# Patient Record
Sex: MALE | Employment: FULL TIME | URBAN - METROPOLITAN AREA
[De-identification: names, ages, dates, MRNs, and addresses within clinical notes are randomized per-mention and may not be internally consistent; named-entity substitution may affect disease eponyms.]

---

## 2018-06-13 ENCOUNTER — OFFICE VISIT (OUTPATIENT)
Dept: FAMILY MEDICINE | Facility: CLINIC | Age: 38
End: 2018-06-13
Payer: OTHER GOVERNMENT

## 2018-06-13 VITALS
HEIGHT: 68 IN | OXYGEN SATURATION: 99 % | HEART RATE: 72 BPM | TEMPERATURE: 97.9 F | SYSTOLIC BLOOD PRESSURE: 123 MMHG | DIASTOLIC BLOOD PRESSURE: 84 MMHG | BODY MASS INDEX: 26.07 KG/M2 | WEIGHT: 172 LBS | RESPIRATION RATE: 16 BRPM

## 2018-06-13 DIAGNOSIS — M79.671 PAIN IN BOTH FEET: Primary | ICD-10-CM

## 2018-06-13 DIAGNOSIS — M79.672 PAIN IN BOTH FEET: Primary | ICD-10-CM

## 2018-06-13 ASSESSMENT — ANXIETY QUESTIONNAIRES
6. BECOMING EASILY ANNOYED OR IRRITABLE: NOT AT ALL
7. FEELING AFRAID AS IF SOMETHING AWFUL MIGHT HAPPEN: NOT AT ALL
1. FEELING NERVOUS, ANXIOUS, OR ON EDGE: NOT AT ALL
GAD7 TOTAL SCORE: 0
5. BEING SO RESTLESS THAT IT IS HARD TO SIT STILL: NOT AT ALL
3. WORRYING TOO MUCH ABOUT DIFFERENT THINGS: NOT AT ALL
IF YOU CHECKED OFF ANY PROBLEMS ON THIS QUESTIONNAIRE, HOW DIFFICULT HAVE THESE PROBLEMS MADE IT FOR YOU TO DO YOUR WORK, TAKE CARE OF THINGS AT HOME, OR GET ALONG WITH OTHER PEOPLE: NOT DIFFICULT AT ALL
2. NOT BEING ABLE TO STOP OR CONTROL WORRYING: NOT AT ALL

## 2018-06-13 ASSESSMENT — ENCOUNTER SYMPTOMS
JOINT SWELLING: 0
MYALGIAS: 0
NUMBNESS: 0
WOUND: 0
FATIGUE: 0
ACTIVITY CHANGE: 0
TREMORS: 0
WEAKNESS: 0
FEVER: 0
BACK PAIN: 1

## 2018-06-13 ASSESSMENT — PATIENT HEALTH QUESTIONNAIRE - PHQ9: 5. POOR APPETITE OR OVEREATING: NOT AT ALL

## 2018-06-13 NOTE — PROGRESS NOTES
"      HPI:       Tristian Magaña is a 38 year old who presents for the following  Patient presents with:  Consult: Pt. presents to the clinic today with low back pain, radiating down Bilateral legs and feet X 6 months.    Has noted in the last 6 months has had pain anterior bilateral thighs and radiates down to his feet. Pain can \"move\" in different places in the front of his legs. Feet pain is the most significant. Has bought new shoes with gel inserts and has not improved pain. Has minimal pain in low back.     Pain in feet after work is rated 8/10. Will resolve with rest. Works on concrete daily. Is in the [employer] and has noted that he will have some foot pain and soreness while exercising, is not as severe as when he is at work. Denies any numbness. Has tingling in soles of feet intermittently. Pain is primarily in arch of feet bilaterally. Denies any history of foot problems. Does not go barefoot for any length of time, does not know if worsens without footwear on. Can have relieve with stretching feet, icing, and advil as needed (recently taking once daily).     Back discomfort rated 2/10, intermittent, typically occurs at the end of the work day. Denies any history of back issues. No pain in back at this time. No radiculopathy or paresthesias in bilateral legs. Denies any red flag symptoms.     Problem, Medication and Allergy Lists were   reviewed and are current.      No current outpatient prescriptions on file.         Allergies   Allergen Reactions     Penicillins      Patient is a new patient to this clinic and so  I reviewed/updated the Past Medical History, the Family History and the Social History.          Review of Systems:   Review of Systems   Constitutional: Negative for activity change, fatigue and fever.   Musculoskeletal: Positive for back pain. Negative for gait problem, joint swelling and myalgias.   Skin: Negative for pallor, rash and wound.   Neurological: Negative for tremors, weakness and " "numbness.             Physical Exam:   Patient Vitals for the past 24 hrs:   BP Temp Temp src Pulse Resp SpO2 Height Weight   06/13/18 1525 123/84 97.9  F (36.6  C) Oral 72 16 99 % 5' 8.4\" (173.7 cm) 172 lb (78 kg)     Body mass index is 25.85 kg/(m^2).  Vitals were reviewed and were normal     Physical Exam   Constitutional: He is oriented to person, place, and time. He appears well-developed and well-nourished.   HENT:   Head: Normocephalic and atraumatic.   Pulmonary/Chest: Effort normal.   Musculoskeletal: Normal range of motion. He exhibits no edema, tenderness or deformity.   Neurological: He is alert and oriented to person, place, and time. Coordination normal.   Skin: Skin is warm and dry. No rash noted. No erythema. No pallor.   Psychiatric: He has a normal mood and affect. His behavior is normal.   Vitals reviewed.     Left Ankle Exam   Swelling: None.    Range of Motion   Normal left ankle ROM  Dorsiflexion:     Normal  Plantar flexion: Normal  Inversion:         Normal  Eversion:         Normal    Muscle Strength   Normal left ankle strength    Comments:  Pain to arch of foot when palpated    Right Ankle Exam   Swelling: None    Range of Motion   Normal right ankle ROM  Dorsiflexion:     Normal  Plantar flexion: Normal  Inversion:         Normal  Eversion:         Normal    Muscle Strength   Normal right ankle strengthComments  Pain to arch when palpated      Back Exam   Sensation: Normal.  Gait: Normal.    Tenderness   None  SLR    Right: Negative  Left:    Negative    Tests   Toe Walk: Normal  Heel Walk: Normal    Reflexes   Patellar:  Normal  Achilles:  Normal    Comments:  Negative TURNER and FADIR bilaterally. No tenderness to spine, buttocks.             Results:      Results from the last 24 hoursNo results found for this or any previous visit (from the past 24 hour(s)).  Assessment and Plan     1. Pain in both feet  With recent pain in bilateral feet coinciding with new job where he is standing on " concrete all day podiatry referral indicated for custom orthotics to help support foot; if pain in legs, back continue once he is wearing specialty shoes consider physical therapy referral for stretching and strengthening. Patient to continue OTC analgesia with APAP and ibuprofen, icing foot, stretching his feet throughout his shift if possible.   - PODIATRY/FOOT & ANKLE SURGERY REFERRAL  There are no discontinued medications.  Options for treatment and follow-up care were reviewed with the patient. Tristian Magaña  engaged in the decision making process and verbalized understanding of the options discussed and agreed with the final plan.    TOM Schneider CNP

## 2018-06-13 NOTE — PATIENT INSTRUCTIONS
May take up to 800mg advil/ibuprofen with food three times daily for pain. Can take with 1000mg tylenol/acetaminophen (max 3 times/daily) as well. Can freeze water bottles, golf balls and roll your feet on them while sitting, try stretching out your feet while on breaks at work.     If your leg pain continues after orthotics are made follow up, will consider physical therapy referral.

## 2018-06-13 NOTE — MR AVS SNAPSHOT
After Visit Summary   6/13/2018    Tristian Magaña    MRN: [id number]           Patient Information     Date Of Birth          1980        Visit Information        Provider Department      6/13/2018 3:30 PM Malissa Alexander APRN CNP [facility]        Today's Diagnoses     Pain in both feet    -  1      Care Instructions    May take up to 800mg advil/ibuprofen with food three times daily for pain. Can take with 1000mg tylenol/acetaminophen (max 3 times/daily) as well. Can freeze water bottles, golf balls and roll your feet on them while sitting, try stretching out your feet while on breaks at work.     If your leg pain continues after orthotics are made follow up, will consider physical therapy referral.           Follow-ups after your visit        Additional Services     PODIATRY/FOOT & ANKLE SURGERY REFERRAL       Your provider has referred you to: [facility]: [facility]: [address] [place] [id number] Patient Scheduling Line: [phone] option 1 (scheduling and directions)    Please be aware that coverage of these services is subject to the terms and limitations of your health insurance plan.  Call member services at your health plan with any benefit or coverage questions.      Please bring the following to your appointment:  >>   Any x-rays, CTs or MRIs which have been performed.  Contact the facility where they were done to arrange for  prior to your scheduled appointment.    >>   List of current medications   >>   This referral request   >>   Any documents/labs given to you for this referral                  Follow-up notes from your care team     Return if symptoms worsen or fail to improve.      Who to contact     Please call your clinic at [phone] to:    Ask questions about your health    Make or cancel appointments    Discuss your medicines    Learn about your test results    Speak to your doctor            Additional Information About Your Visit       "  MyChart Information     Noveportert is an electronic gateway that provides easy, online access to your medical records. With icanbuy, you can request a clinic appointment, read your test results, renew a prescription or communicate with your care team.     To sign up for icanbuy visit the website at [email or web address]   You will be asked to enter the access code listed below, as well as some personal information. Please follow the directions to create your username and password.     Your access code is: [email or web address]  Expires: 2018  3:53 PM     Your access code will  in 90 days. If you need help or a new code, please contact your [facility] or call [phone] for assistance.        Care EveryWhere ID     This is your Care EveryWhere ID. This could be used by other organizations to access your [place] medical records  [id number]        Your Vitals Were     Pulse Temperature Respirations Height Pulse Oximetry BMI (Body Mass Index)    72 97.9  F (36.6  C) (Oral) 16 5' 8.4\" (173.7 cm) 99% 25.85 kg/m2       Blood Pressure from Last 3 Encounters:   18 123/84    Weight from Last 3 Encounters:   18 172 lb (78 kg)              We Performed the Following     PODIATRY/FOOT & ANKLE SURGERY REFERRAL        Primary Care Provider    None Specified       No primary provider on file.        Equal Access to Services     VANESSA RAMIREZ : Hadii aad ku hadasho Soomaali, waaxda luqadaha, qaybta kaalmada sweta, my morrow . So [facility] [phone].    ATENCIÓN: Si habla español, tiene a raines disposición servicios gratuitos de asistencia lingüística. Jennifer al [phone].    We comply with applicable federal civil rights laws and [place] laws. We do not discriminate on the basis of race, color, national origin, age, disability, sex, sexual orientation, or gender identity.            Thank you!     Thank you for choosing [facility]  for " your care. Our goal is always to provide you with excellent care. Hearing back from our patients is one way we can continue to improve our services. Please take a few minutes to complete the written survey that you may receive in the mail after your visit with us. Thank you!             Your Updated Medication List - Protect others around you: Learn how to safely use, store and throw away your medicines at www.disposemymeds.org.      Notice  As of 6/13/2018  3:53 PM    You have not been prescribed any medications.

## 2018-06-13 NOTE — NURSING NOTE
"38 year old  Chief Complaint   Patient presents with     Consult     Pt. presents to the clinic today with low back pain, radiating down Bilateral legs and feet X 6 months.       Blood pressure 123/84, pulse 72, temperature 97.9  F (36.6  C), temperature source Oral, resp. rate 16, height 5' 8.4\" (173.7 cm), weight 172 lb (78 kg), SpO2 99 %. Body mass index is 25.85 kg/(m^2).  BP completed using cuff size:    There is no problem list on file for this patient.      Wt Readings from Last 2 Encounters:   06/13/18 172 lb (78 kg)     BP Readings from Last 3 Encounters:   06/13/18 123/84       Allergies   Allergen Reactions     Penicillins        No current outpatient prescriptions on file.     No current facility-administered medications for this visit.        Social History   Substance Use Topics     Smoking status: Never Smoker     Smokeless tobacco: Never Used     Alcohol use 3.6 oz/week     6 Cans of beer per week         Honoring Choices - Health Care Directive Guide offered to patient at time of visit.    Health Maintenance Due   Topic Date Due     TETANUS IMMUNIZATION (SYSTEM ASSIGNED)  03/09/1998     HIV SCREEN (SYSTEM ASSIGNED)  03/09/1998     LIPID SCREEN Q5 YR MALE (SYSTEM ASSIGNED)  03/09/2015         There is no immunization history on file for this patient.    No results found for: PAP      No lab results found.    PHQ-2 ( 1999 Pfizer) 6/13/2018   Q1: Little interest or pleasure in doing things 0   Q2: Feeling down, depressed or hopeless 0   PHQ-2 Score 0       PHQ-9 SCORE 6/13/2018   Total Score 0       DOMENICO-7 SCORE 6/13/2018   Total Score 0       No flowsheet data found.    Rody Cho CMA  June 13, 2018 3:29 PM  "

## 2018-06-14 ASSESSMENT — PATIENT HEALTH QUESTIONNAIRE - PHQ9: SUM OF ALL RESPONSES TO PHQ QUESTIONS 1-9: 0

## 2018-06-14 ASSESSMENT — ANXIETY QUESTIONNAIRES: GAD7 TOTAL SCORE: 0

## 2018-07-12 NOTE — TELEPHONE ENCOUNTER
FUTURE VISIT INFORMATION      FUTURE VISIT INFORMATION:    Date: 7/27/18    Time: 1140    Location: ORTHO  REFERRAL INFORMATION:    Referring provider:  MADELINE KEY    Referring providers clinic:  [facility]     Reason for visit/diagnosis  FOOT PAIN      RECORDS STATUS    RECORDS IN CHART

## 2018-07-27 ENCOUNTER — PRE VISIT (OUTPATIENT)
Dept: ORTHOPEDICS | Facility: CLINIC | Age: 38
End: 2018-07-27

## 2019-01-17 ENCOUNTER — OFFICE VISIT (OUTPATIENT)
Dept: FAMILY MEDICINE | Facility: CLINIC | Age: 39
End: 2019-01-17
Payer: OTHER GOVERNMENT

## 2019-01-17 VITALS
DIASTOLIC BLOOD PRESSURE: 84 MMHG | TEMPERATURE: 98.2 F | BODY MASS INDEX: 27.28 KG/M2 | HEART RATE: 72 BPM | RESPIRATION RATE: 16 BRPM | OXYGEN SATURATION: 98 % | WEIGHT: 180 LBS | HEIGHT: 68 IN | SYSTOLIC BLOOD PRESSURE: 131 MMHG

## 2019-01-17 DIAGNOSIS — R20.2 PARESTHESIA OF LEFT ARM: Primary | ICD-10-CM

## 2019-01-17 RX ORDER — CHLORAL HYDRATE 500 MG
2 CAPSULE ORAL DAILY
COMMUNITY

## 2019-01-17 ASSESSMENT — MIFFLIN-ST. JEOR: SCORE: 1710.97

## 2019-01-17 NOTE — NURSING NOTE
"38 year old  Chief Complaint   Patient presents with     Consult     Pt. presents to the clinic today with feelings of \"pins and needles\" in his left hand, arm to the elbow. X 6 months off and on.        Blood pressure 131/84, pulse 72, temperature 98.2  F (36.8  C), temperature source Oral, resp. rate 16, height 1.727 m (5' 8\"), weight 81.6 kg (180 lb), SpO2 98 %. Body mass index is 27.37 kg/m .  BP completed using cuff size:    There is no problem list on file for this patient.      Wt Readings from Last 2 Encounters:   01/17/19 81.6 kg (180 lb)   06/13/18 78 kg (172 lb)     BP Readings from Last 3 Encounters:   01/17/19 131/84   06/13/18 123/84       Allergies   Allergen Reactions     Penicillins        Current Outpatient Medications   Medication     fish oil-omega-3 fatty acids 1000 MG capsule     Multiple Vitamins-Minerals (MULTIVITAMIN ADULT PO)     No current facility-administered medications for this visit.        Social History     Tobacco Use     Smoking status: Never Smoker     Smokeless tobacco: Never Used   Substance Use Topics     Alcohol use: Yes     Alcohol/week: 3.6 oz     Types: 6 Cans of beer per week     Drug use: No         Honoring Choices - Health Care Directive Guide offered to patient at time of visit.    Health Maintenance Due   Topic Date Due     HIV SCREEN (SYSTEM ASSIGNED)  03/09/1998     DTAP/TDAP/TD IMMUNIZATION (1 - Tdap) 03/09/2005     LIPID SCREEN Q5 YR MALE (SYSTEM ASSIGNED)  03/09/2015     INFLUENZA VACCINE (1) 09/01/2018         There is no immunization history on file for this patient.    No results found for: PAP      No lab results found.    PHQ-2 ( 1999 Pfizer) 6/13/2018   Q1: Little interest or pleasure in doing things 0   Q2: Feeling down, depressed or hopeless 0   PHQ-2 Score 0       PHQ-9 SCORE 6/13/2018   PHQ-9 Total Score 0       DOMENICO-7 SCORE 6/13/2018   Total Score 0       No flowsheet data found.    Rody Cho CMA  January 17, 2019 2:47 PM  "

## 2019-01-17 NOTE — PATIENT INSTRUCTIONS
1) Referral to sports medicine today. If you are not able to get in to be seen within 2 weeks, please let me know and I will call them and attempt to expedite this process.     2) The walk-in clinic is always available to you as well. Information below:  [facility]opedic [facility]  [facility]   [address]oor 4  [address] [place] [address]  See Directions and Parking for this building.  Hours  7 days a week, 7 a.m. to 7 p.m.    Phone Numbers  Information: [phone]

## 2019-01-17 NOTE — PROGRESS NOTES
"       HPI       Tristian Magaña is a 38 year old who presents for     Chief Complaint   Patient presents with     Consult     Pt. presents to the clinic today with feelings of \"pins and needles\" in his left hand, arm to the elbow. X 6 months off and on.      Tristian reports a 6+ month history of intermittent numbness and tingling to his left upper extremity. Specifically, tingling is noted from his elbow down through his fingers. Denies pain, popping or clicking in the left shoulder. 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     Patient reports his symptoms are more bothersome at night and have made it difficult for him to fall asleep and have awoken him overnight. Reports he is generally so tired from working all day that he is able to sleep through the discomfort.     Tristian suspects the tingling is triggered by a nerve in his left shoulder. Reports stiffness and pain in the left side of his neck and notes a localized area of discomfort to anterior aspect of left shoulder just below his clavicle. He finds his symptoms are worse if he is sitting at a computer for prolonged periods of time or if he is leaning over onto his elbows such as when he is sitting at a bar. When pressure is placed on his left shoulder he has noted this to trigger the tingling in his left forearm, hand, and fingers. Numbness and tingling generally resolves when he changes positions. He denies weakness in his upper extremities. He has no symptoms in his right arm. Tristian has not tried any home therapies or medications to manage his symptoms.     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 He is on his feet all day long. 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       History reviewed. No pertinent past medical history.     Past Surgical History:   Procedure Laterality Date     NO HISTORY OF SURGERY       Current Outpatient Medications   Medication     fish oil-omega-3 fatty acids 1000 MG capsule     Multiple Vitamins-Minerals (MULTIVITAMIN ADULT PO)     No current facility-administered medications for this visit.         Allergies   Allergen Reactions     Penicillins        Problem, Medication and Allergy Lists were reviewed and updated if needed..    Patient is an established patient of this clinic..         Review of Systems:   Review of Systems     ROS: 10 point ROS neg other than the symptoms noted above in the HPI.         Physical Exam:     Vitals:    01/17/19 1445   BP: 131/84   BP Location: Left arm   Patient Position: Chair   Cuff Size: Adult Regular   Pulse: 72   Resp: 16   Temp: 98.2  F (36.8  C)   TempSrc: Oral   SpO2: 98%   Weight: 81.6 kg (180 lb)   Height: 1.727 m (5' 8\")     Body mass index is 27.37 kg/m .  Vitals were reviewed and were normal except mildly elevated blood pressure.      Physical Exam   Constitutional: He is oriented to person, place, and time. He appears well-developed and well-nourished. No distress.   Cardiovascular: Normal rate.   Pulmonary/Chest: Effort normal. No respiratory distress.   Musculoskeletal:        Left shoulder: Normal. He exhibits normal range of motion, no tenderness, no bony tenderness, no swelling, no effusion, no crepitus, no deformity, no laceration, no pain, no spasm, normal pulse and normal strength.   Neurological: He is alert and oriented to person, place, and time.   Skin: Skin is warm and dry.   Psychiatric: He has a normal mood and affect. His behavior is normal.       Results:     No laboratory testing was completed at today's visit.    Assessment and Plan      1. Paresthesia of left arm  Comment: Pt with 6+ month hx of numbness and tingling in left " arm, worse over the past 2 weeks. Pt suspects this is related to a pinched nerve in his left shoulder, has left sided neck pain and left shoulder pain that seems to trigger the tingling, worse with specific positions. On exam, able to elicit tingling in left forearm and hand with palpation of anterior left shoulder. Physical exam is otherwise unremarkable. I suspect there is left shoulder nerve involvement as possible cause of left arm numbness and tingling. Carpal tunnel syndrome is also on the differential list. Patient is eager to find answers and is interested in sports medicine referral. He will entertain the idea of PT in the meantime.   Plan:   - SPORTS MEDICINE REFERRAL   - PHYSICAL THERAPY REFERRAL; Future   - May also try [facility]    Follow-up: Return to clinic if symptoms fail to improve, worsen, or new symptoms develop with the above treatment plan.        There are no discontinued medications.    Options for treatment and follow-up care were reviewed with the patient. Tristian Magaña  engaged in the decision making process and verbalized understanding of the options discussed and agreed with the final plan.    TOM Bruner CNP

## 2019-01-25 NOTE — TELEPHONE ENCOUNTER
RECORDS RECEIVED FROM: internal   DATE RECEIVED: 1/24/19   NOTES STATUS DETAILS   OFFICE NOTE from referring provider Internal    OFFICE NOTE from other specialist Internal    DISCHARGE SUMMARY from hospital N/A    DISCHARGE REPORT from the ER N/A    OPERATIVE REPORT N/A    MEDICATION LIST Internal    IMPLANT RECORD/STICKER N/A    LABS     CBC/DIFF N/A    CULTURES N/A    INJECTIONS DONE IN RADIOLOGY N/A    MRI N/A    CT SCAN N/A    XRAYS (IMAGES & REPORTS) N/A    TUMOR     PATHOLOGY  Slides & report N/A    Paresthesia of left arm, Referred by Jing Noel, no outside records,no imaging,no previous surgerys

## 2019-01-29 ENCOUNTER — ANCILLARY PROCEDURE (OUTPATIENT)
Dept: GENERAL RADIOLOGY | Facility: CLINIC | Age: 39
End: 2019-01-29
Payer: OTHER GOVERNMENT

## 2019-01-29 ENCOUNTER — PRE VISIT (OUTPATIENT)
Dept: ORTHOPEDICS | Facility: CLINIC | Age: 39
End: 2019-01-29

## 2019-01-29 ENCOUNTER — OFFICE VISIT (OUTPATIENT)
Dept: ORTHOPEDICS | Facility: CLINIC | Age: 39
End: 2019-01-29
Payer: OTHER GOVERNMENT

## 2019-01-29 VITALS
HEIGHT: 68 IN | SYSTOLIC BLOOD PRESSURE: 131 MMHG | DIASTOLIC BLOOD PRESSURE: 84 MMHG | BODY MASS INDEX: 27.28 KG/M2 | WEIGHT: 180 LBS

## 2019-01-29 DIAGNOSIS — R20.2 PARESTHESIA OF LEFT ARM: Primary | ICD-10-CM

## 2019-01-29 DIAGNOSIS — R20.2 PARESTHESIA OF LEFT ARM: ICD-10-CM

## 2019-01-29 ASSESSMENT — MIFFLIN-ST. JEOR: SCORE: 1710.97

## 2019-01-29 NOTE — LETTER
"  1/29/2019      RE: Tristian Magaña  [address] [facility] [address]        Subjective:   Tristian Magaña is a 38 year old male who complains of left arm pain. He states that he has been dealing with the pain in his arm for about 5 months. About 4 weeks ago, he exacerbated the pain while at drill.  He states that he has left upper extremity pain and paresthesias which seem to radiate from the proximal left upper extremity toward the elbow into the forearm and then into the dorsal aspect of fingers 2 through 4 as best he can recall.  This is intermittent.  He has not noticed any motor weakness with this.  He does not have myriam neck pain per se.    Background:   Date of injury: None   Duration of symptoms: 5 months  Mechanism of Injury: Insidious Onset; Unknown   Aggravating factors: Computer use, worse at night  Relieving Factors: Ibuprofen, correct posture  Prior Evaluation: Prior Physician Evalutation: Dr. Harrison    PAST MEDICAL, SOCIAL, SURGICAL AND FAMILY HISTORY: He  has no past medical history on file.  He  has a past surgical history that includes no history of surgery.  His family history includes Dementia in his father; Diabetes in his father; No Known Problems in his brother, mother, and sister; Parkinsonism in his father; Seizure Disorder in his father.  He reports that  has never smoked. he has never used smokeless tobacco. He reports that he drinks about 3.6 oz of alcohol per week. He reports that he does not use drugs.    ALLERGIES: He is allergic to penicillins.    CURRENT MEDICATIONS: He has a current medication list which includes the following prescription(s): fish oil-omega-3 fatty acids and multiple vitamins-minerals.     REVIEW OF SYSTEMS: 12 point review of systems is negative except as noted above.     Exam:   /84   Ht 1.727 m (5' 8\")   Wt 81.6 kg (180 lb)   BMI 27.37 kg/m         CONSTITUTIONAL: healthy, alert and no distress  HEAD: Normocephalic. No masses, lesions, " tenderness or abnormalities  SKIN: no suspicious lesions or rashes  GAIT: normal  NEUROLOGIC: Non-focal  PSYCHIATRIC: affect normal/bright and mentation appears normal.    MUSCULOSKELETAL:   Cervical spine: He has functional flexion and extension of the cervical spine.  Extension rotation to the right and left produce neck pain and he has a positive Spurling's maneuver to the left.  Motor examination of the bilateral upper extremities demonstrates 5 out of 5 strength bilaterally.  Sensation to light touch is intact bilaterally.  He has negative Mariah's bilaterally.  Deep tendon reflexes are trace and symmetric bilaterally.  He has no shoulder girdle tenderness and no trigger points.  Full range of motion in the left shoulder.  He has full range of motion in the left elbow with a negative Tinel's over the cubital tunnel.  He has full range of motion of the left wrist with no swelling and deformity and a negative Tinel's over the carpal tunnel and Guyon's canal.       Assessment/Plan:   (R20.2) Paresthesia of left arm  (primary encounter diagnosis)  Plan: X-ray Cervical Spine G/E 4 Views, MRI Cervical         spine w/o contrast        Tristian's symptoms as well as his physical exam suggest a left-sided neural foraminal encroachment or cervical disc.  I suspect this is going to be at C4-C5 for C5-C6 levels.  We will proceed with an MRI of the cervical spine.  He will return to follow-up with me pending the MRI for our review.      Fermin Hernandez MD

## 2019-01-30 ENCOUNTER — ANCILLARY PROCEDURE (OUTPATIENT)
Dept: MRI IMAGING | Facility: CLINIC | Age: 39
End: 2019-01-30
Payer: OTHER GOVERNMENT

## 2019-01-30 DIAGNOSIS — R20.2 PARESTHESIA OF LEFT ARM: ICD-10-CM

## 2019-02-05 ENCOUNTER — OFFICE VISIT (OUTPATIENT)
Dept: ORTHOPEDICS | Facility: CLINIC | Age: 39
End: 2019-02-05
Payer: OTHER GOVERNMENT

## 2019-02-05 VITALS — HEIGHT: 68 IN | BODY MASS INDEX: 27.28 KG/M2 | WEIGHT: 180 LBS | RESPIRATION RATE: 16 BRPM

## 2019-02-05 DIAGNOSIS — M54.12 CERVICAL RADICULOPATHY: Primary | ICD-10-CM

## 2019-02-05 DIAGNOSIS — M50.20 CERVICAL DISC HERNIATION: ICD-10-CM

## 2019-02-05 ASSESSMENT — MIFFLIN-ST. JEOR: SCORE: 1710.97

## 2019-02-05 NOTE — LETTER
2/5/2019      RE: Tristian Magaña  [address] [facility], [place], operated by [facility] [address]        Subjective:   Tristian Magaña is a 38 year old male who is here following up on left arm paresthesia. He is here to review his MRI results.     Date of injury: NA  Date last seen: 1/29/2019  Following Therapeutic Plan: Yes MRI 1/30/19  Pain: Minor improvement  Function: Unchanged    We have reviewed his MRI in detail.  He has a C5-C6 disc herniation with some cephalad migration.  This is causing compression of the nerve root.  We are going to proceed with a left C5-C6 transforaminal epidural steroid injection.  We will do that this week.  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  If he has excellent relief with his first injection he will cancel the second.  He will follow-up with me sometime after he returns from his pre-deployment training.    I spent 25 minutes in face to face time with the patient and greater than 20 minutes in counseling and coordination of care.    Fermin Hernandez MD

## 2019-02-05 NOTE — PROGRESS NOTES
Subjective:   Tristian Magaña is a 38 year old male who is here following up on left arm paresthesia. He is here to review his MRI results.     Date of injury: NA  Date last seen: 1/29/2019  Following Therapeutic Plan: Yes MRI 1/30/19  Pain: Minor improvement  Function: Unchanged    We have reviewed his MRI in detail.  He has a C5-C6 disc herniation with some cephalad migration.  This is causing compression of the nerve root.  We are going to proceed with a left C5-C6 transforaminal epidural steroid injection.  We will do that this week.  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  If he has excellent relief with his first injection he will cancel the second.  He will follow-up with me sometime after he returns from his pre-deployment training.    I spent 25 minutes in face to face time with the patient and greater than 20 minutes in counseling and coordination of care.

## 2019-02-07 ENCOUNTER — TELEPHONE (OUTPATIENT)
Dept: ORTHOPEDICS | Facility: CLINIC | Age: 39
End: 2019-02-07

## 2019-02-07 NOTE — TELEPHONE ENCOUNTER
"[facility] Call Center    Phone Message    May a detailed message be left on voicemail: no    Reason for Call: Other: Yamileth wanted to let Dr. Hernandez know that at the [facility], they only do nerve root block or direct epidural injections. Yamileth stated that on the order it noted a \"transforaminal epidural injection\" and they don't perform that at the facility there. Please call with any questions or modify the order to be faxed over. Pt has the procedure scheduled for tomorrow 2/8/2019.     Action Taken: Message routed to:  [facility] Sports Med  "

## 2019-02-07 NOTE — TELEPHONE ENCOUNTER
I poke with a radiologist over at [facility], and she states that everything has been figured out so nothing else is needed.

## 2019-02-08 ENCOUNTER — HOSPITAL ENCOUNTER (OUTPATIENT)
Dept: GENERAL RADIOLOGY | Facility: CLINIC | Age: 39
End: 2019-02-08
Attending: FAMILY MEDICINE
Payer: OTHER GOVERNMENT

## 2019-02-08 ENCOUNTER — HOSPITAL ENCOUNTER (OUTPATIENT)
Facility: CLINIC | Age: 39
Discharge: HOME | End: 2019-02-08
Admitting: FAMILY MEDICINE
Payer: OTHER GOVERNMENT

## 2019-02-08 VITALS
HEIGHT: 70 IN | SYSTOLIC BLOOD PRESSURE: 117 MMHG | BODY MASS INDEX: 25.62 KG/M2 | TEMPERATURE: 98.3 F | HEART RATE: 79 BPM | WEIGHT: 179 LBS | RESPIRATION RATE: 16 BRPM | OXYGEN SATURATION: 94 % | DIASTOLIC BLOOD PRESSURE: 77 MMHG

## 2019-02-08 DIAGNOSIS — M54.12 CERVICAL RADICULOPATHY: ICD-10-CM

## 2019-02-08 PROCEDURE — 25000128 H RX IP 250 OP 636: Performed by: FAMILY MEDICINE

## 2019-02-08 PROCEDURE — 40000863 ZZH STATISTIC RADIOLOGY XRAY, US, CT, MAR, NM

## 2019-02-08 PROCEDURE — 25000125 ZZHC RX 250: Performed by: FAMILY MEDICINE

## 2019-02-08 PROCEDURE — 27211111 XR NERVE BLOCK ROOT CERVICAL/THORACIC SINGLE LEVEL

## 2019-02-08 PROCEDURE — 25500064 ZZH RX 255 OP 636: Performed by: FAMILY MEDICINE

## 2019-02-08 RX ORDER — IBUPROFEN 600 MG/1
600 TABLET, FILM COATED ORAL EVERY 6 HOURS PRN
COMMUNITY
End: 2021-11-11

## 2019-02-08 RX ORDER — LIDOCAINE HYDROCHLORIDE 10 MG/ML
30 INJECTION, SOLUTION EPIDURAL; INFILTRATION; INTRACAUDAL; PERINEURAL ONCE
Status: COMPLETED | OUTPATIENT
Start: 2019-02-08 | End: 2019-02-08

## 2019-02-08 RX ORDER — DEXTROSE MONOHYDRATE 25 G/50ML
25-50 INJECTION, SOLUTION INTRAVENOUS
Status: CANCELLED | OUTPATIENT
Start: 2019-02-08

## 2019-02-08 RX ORDER — DEXTROSE MONOHYDRATE 25 G/50ML
25-50 INJECTION, SOLUTION INTRAVENOUS
Status: DISCONTINUED | OUTPATIENT
Start: 2019-02-08 | End: 2019-02-08 | Stop reason: HOSPADM

## 2019-02-08 RX ORDER — IOPAMIDOL 408 MG/ML
10 INJECTION, SOLUTION INTRATHECAL ONCE
Status: COMPLETED | OUTPATIENT
Start: 2019-02-08 | End: 2019-02-08

## 2019-02-08 RX ORDER — DEXAMETHASONE SODIUM PHOSPHATE 10 MG/ML
10 INJECTION, SOLUTION INTRAMUSCULAR; INTRAVENOUS ONCE
Status: COMPLETED | OUTPATIENT
Start: 2019-02-08 | End: 2019-02-08

## 2019-02-08 RX ORDER — NICOTINE POLACRILEX 4 MG
15-30 LOZENGE BUCCAL
Status: DISCONTINUED | OUTPATIENT
Start: 2019-02-08 | End: 2019-02-08 | Stop reason: HOSPADM

## 2019-02-08 RX ORDER — NICOTINE POLACRILEX 4 MG
15-30 LOZENGE BUCCAL
Status: CANCELLED | OUTPATIENT
Start: 2019-02-08

## 2019-02-08 RX ADMIN — LIDOCAINE HYDROCHLORIDE 3 ML: 10 INJECTION, SOLUTION EPIDURAL; INFILTRATION; INTRACAUDAL; PERINEURAL at 15:09

## 2019-02-08 RX ADMIN — IOPAMIDOL 1 ML: 408 INJECTION, SOLUTION INTRATHECAL at 15:07

## 2019-02-08 RX ADMIN — DEXAMETHASONE SODIUM PHOSPHATE 10 MG: 10 INJECTION, SOLUTION INTRAMUSCULAR; INTRAVENOUS at 15:09

## 2019-02-08 ASSESSMENT — MIFFLIN-ST. JEOR: SCORE: 1738.19

## 2019-02-08 NOTE — IP AVS SNAPSHOT
MRN:[id number]                      After Visit Summary   2/8/2019    Tristian Magaña    MRN: [id number]           Visit Information        Provider Department      2/8/2019  2:00 PM SH NEURO RAD; SHXR4 [facility] Radiology           Review of your medicines      Notice    This visit is during an admission. Changes to the med list made in this visit will be reflected in the After Visit Summary of the admission.           Protect others around you: Learn how to safely use, store and throw away your medicines at www.disposemymeds.org.       Follow-ups after your visit       Your next 10 appointments already scheduled    Mar 11, 2019  3:00 PM CDT  XR CERVICAL/THORACIC TRANSFORAMINAL INJ LEFT with SHXR4, SH MSK RAD  [facility] Radiology ([facility]) [facility] [address] [phone]   How do I prepare for my exam? (Food and drink instructions) Stop all food and drink (including water) 3 hours before your test or treatment.  How do I prepare for my exam? (Other instructions) Stop taking the following medicines (but talk to your doctor first): * If you take blood thinners, you may need to stop taking them a few days before treatment. Talk to your doctor before stopping these medicines. Stop taking Coumadin (warfarin) 3 days before treatment. Restart the day after treatment. * If you take Plavix, Ticlid, Pletal or Persantine, please ask your doctor if you should stop these medicines. You may need extra tests on the morning of your scan. * If you take Xarelto (Rivaroxaban), you may need to stop taking it 24 hours before treatment. Talk to your doctor before stopping this medicine. Restart the day after treatment.  You may take your other medicines as normal.  What should I wear: Wear comfortable clothes.  How long does the exam take: Injections take about 30 to 45 minutes. Most people spend up to 2 hours in the clinic or hospital.  What should I bring:  For nerve root injection, please send or bring copies of any MRIs or other scans you have had. Please bring a list of your current medicines to your exam. Include vitamins, minerals and over-the-counter medicines.  Do I need a :  Plan to have someone drive you home afterward.  What do I need to tell my doctor: Tell your doctor if: * You have ever had an allergic reaction to X-ray dye (contrast fluid). * If there s any chance you are pregnant.  What should I do after the exam: We may ask you to lie down for a short time before you go home. If you had a nerve shot and your arm or leg feels numb, you will stay for up to two hours until the numbness wears off. You may return to your normal activities the next day.  What is this test: A spinal shot is done in or near the spine. You may receive steroid medicine (to reduce swelling) or contrast fluid (dye that makes the area show up more clearly on X-rays).  Who should I call with questions: If you have any questions, please call the Imaging Department where you will have your exam. Directions, parking instructions, and other information is available on our website, [email or web address].      Care Instructions       Further instructions from your care team       Steroid Injection Discharge Instructions     After you go home:      You may resume your normal diet.    Care of Puncture Site:      If you have a bandaid on your puncture site, you may remove it the next morning    You may shower tomorrow    No bath tubs, whirlpools or swimming for at least 3 days     Activity:      You may go back to normal activity in 24 hours    You should let pain be your guide as to the extent of your activities    Maintain any activity limitations as ordered by your provider    Do NOT drive a vehicle until tomorrow    Medicines:      You may resume all medications    Resume your Warfarin/Coumadin at your regular dose today. Follow up with your provider to have your INR  rechecked    Resume your Platelet Inhibitors and Aspirin tomorrow at your regular dose    For minor pain, you may take Acetaminophen (Tylenol) or Ibuprofen (Advil)    Pain:       You may experience increased or different pain over the next 24-48 hours    For the next 48 hrs - you may use ice packs for discomfort     Call your primary care doctor if:      You have severe pain that does not improve with pain medication    You have chills or a fever greater than 101 F (38 C)    The site is red, swollen, hot or tender    New problems with your bowel or bladder    Any questions or concerns    Other Instructions:      New numbness down your leg post injection is temporary and may last for up to 6 hours. You may need assistance with activity until your leg has normal sensation.    If you are diabetic, monitor your blood sugar closely. Contact the provider who manages your diabetes to help you control your blood sugar if needed.    For Your Information:      A steroid was injected to help decrease swelling and may help to reduce pain. It may take up to 7-10 days to obtain full results.    Some patients will get lasting relief from a single injection. Others may require up to 3 injections to get results. If you have more than one steroid injection, they should be given 2 weeks apart.    Side effects of your steroid injection are mild and will go away in 2-3 days  - Insomnia  - Heartburn  - Flushed face  - Water retention  - Increased appetite  - Increased blood sugar      If you have questions call:        [facility] Radiology Dept @ [phone]      The provider who performed your procedure was _________________.        Additional Information About Your Visit       [employer] Information    [facility] gives you secure access to your electronic health record. If you see a primary care provider, you can also send messages to your care team and make appointments. If you have questions, please call your primary care clinic.   If you do not have a primary care provider, please call [phone] and they will assist you.       Care EveryWhere ID    This is your Care EveryWhere ID. This could be used by other organizations to access your [facility] medical records  [id number]        Primary Care Provider Fax #    Physician No Ref-Primary [phone]      Equal Access to Services    [facility] : Hadii aad ku hadasho Soomaali, waaxda luqadaha, qaybta kaalmada adepriscillayada, my neal haynanette dennisniloalee morrow . So [facility] [phone].    ATENCIÓN: Si habla español, tiene a raines disposición servicios gratuitos de asistencia lingüística. Llame al [phone].    We comply with applicable federal civil rights laws and [place] laws. We do not discriminate on the basis of race, color, national origin, age, disability, sex, sexual orientation, or gender identity.           Thank you!    Thank you for choosing [facility] for your care. Our goal is always to provide you with excellent care. Hearing back from our patients is one way we can continue to improve our services. Please take a few minutes to complete the written survey that you may receive in the mail after you visit with us. Thank you!         Medication List     Notice    This visit is during an admission. Changes to the med list made in this visit will be reflected in the After Visit Summary of the admission.

## 2019-02-08 NOTE — DISCHARGE INSTRUCTIONS
Steroid Injection Discharge Instructions     After you go home:      You may resume your normal diet.    Care of Puncture Site:      If you have a bandaid on your puncture site, you may remove it the next morning    You may shower tomorrow    No bath tubs, whirlpools or swimming for at least 3 days     Activity:      You may go back to normal activity in 24 hours    You should let pain be your guide as to the extent of your activities    Maintain any activity limitations as ordered by your provider    Do NOT drive a vehicle until tomorrow    Medicines:      You may resume all medications    Resume your Warfarin/Coumadin at your regular dose today. Follow up with your provider to have your INR rechecked    Resume your Platelet Inhibitors and Aspirin tomorrow at your regular dose    For minor pain, you may take Acetaminophen (Tylenol) or Ibuprofen (Advil)    Pain:       You may experience increased or different pain over the next 24-48 hours    For the next 48 hrs - you may use ice packs for discomfort     Call your primary care doctor if:      You have severe pain that does not improve with pain medication    You have chills or a fever greater than 101 F (38 C)    The site is red, swollen, hot or tender    New problems with your bowel or bladder    Any questions or concerns    Other Instructions:      New numbness down your leg post injection is temporary and may last for up to 6 hours. You may need assistance with activity until your leg has normal sensation.    If you are diabetic, monitor your blood sugar closely. Contact the provider who manages your diabetes to help you control your blood sugar if needed.    For Your Information:      A steroid was injected to help decrease swelling and may help to reduce pain. It may take up to 7-10 days to obtain full results.    Some patients will get lasting relief from a single injection. Others may require up to 3 injections to get results. If you have more than one  steroid injection, they should be given 2 weeks apart.    Side effects of your steroid injection are mild and will go away in 2-3 days  - Insomnia  - Heartburn  - Flushed face  - Water retention  - Increased appetite  - Increased blood sugar      If you have questions call:        [facility] Radiology Dept @ [phone]      The provider who performed your procedure was _________________.

## 2019-02-08 NOTE — PROGRESS NOTES
1520 Returned from x-ray post nerve root injection. VSS. Denies pain or numbness. Drinking water.  1545 OK to discharge when stable per Aris OLIVA  1600 Discharge to home ambulatory. Instructions were given prior to procedure by Neil SIMMS.

## 2019-02-08 NOTE — PROGRESS NOTES
RADIOLOGY PROCEDURE NOTE  Patient name: Tristian Magaña  MRN: [id number]  : 1980    Pre-procedure diagnosis: Cervicalgia  Post-procedure diagnosis: Same    Procedure Date/Time: 2019  3:12 PM  Procedure: C6 selective nerve root injection  Estimated blood loss: None  Specimen(s) collected with description: none  The patient tolerated the procedure well with no immediate complications.  Significant findings:none    See imaging dictation for procedural details.    Provider name: Aris Paula  Assistant(s):None

## 2019-03-11 ENCOUNTER — HOSPITAL ENCOUNTER (OUTPATIENT)
Facility: CLINIC | Age: 39
Discharge: HOME | End: 2019-03-11
Attending: RADIOLOGY | Admitting: RADIOLOGY
Payer: OTHER GOVERNMENT

## 2019-03-11 ENCOUNTER — HOSPITAL ENCOUNTER (OUTPATIENT)
Dept: GENERAL RADIOLOGY | Facility: CLINIC | Age: 39
End: 2019-03-11
Attending: FAMILY MEDICINE | Admitting: RADIOLOGY
Payer: OTHER GOVERNMENT

## 2019-03-11 VITALS
HEIGHT: 69 IN | HEART RATE: 71 BPM | RESPIRATION RATE: 16 BRPM | BODY MASS INDEX: 25.77 KG/M2 | SYSTOLIC BLOOD PRESSURE: 129 MMHG | TEMPERATURE: 98 F | DIASTOLIC BLOOD PRESSURE: 87 MMHG | OXYGEN SATURATION: 98 % | WEIGHT: 174 LBS

## 2019-03-11 DIAGNOSIS — M54.12 CERVICAL RADICULOPATHY: ICD-10-CM

## 2019-03-11 PROCEDURE — 40000863 ZZH STATISTIC RADIOLOGY XRAY, US, CT, MAR, NM

## 2019-03-11 PROCEDURE — 27211111 XR CERVICAL/THORACIC TRANSFORAMINAL INJ LEFT

## 2019-03-11 PROCEDURE — 25500064 ZZH RX 255 OP 636: Performed by: PHYSICIAN ASSISTANT

## 2019-03-11 PROCEDURE — 25000128 H RX IP 250 OP 636: Performed by: PHYSICIAN ASSISTANT

## 2019-03-11 PROCEDURE — 25000125 ZZHC RX 250: Performed by: PHYSICIAN ASSISTANT

## 2019-03-11 RX ORDER — DEXTROSE MONOHYDRATE 25 G/50ML
25-50 INJECTION, SOLUTION INTRAVENOUS
Status: CANCELLED | OUTPATIENT
Start: 2019-03-11

## 2019-03-11 RX ORDER — DEXTROSE MONOHYDRATE 25 G/50ML
25-50 INJECTION, SOLUTION INTRAVENOUS
Status: DISCONTINUED | OUTPATIENT
Start: 2019-03-11 | End: 2019-03-11 | Stop reason: HOSPADM

## 2019-03-11 RX ORDER — NICOTINE POLACRILEX 4 MG
15-30 LOZENGE BUCCAL
Status: CANCELLED | OUTPATIENT
Start: 2019-03-11

## 2019-03-11 RX ORDER — DEXAMETHASONE SODIUM PHOSPHATE 10 MG/ML
10 INJECTION, SOLUTION INTRAMUSCULAR; INTRAVENOUS ONCE
Status: COMPLETED | OUTPATIENT
Start: 2019-03-11 | End: 2019-03-11

## 2019-03-11 RX ORDER — NICOTINE POLACRILEX 4 MG
15-30 LOZENGE BUCCAL
Status: DISCONTINUED | OUTPATIENT
Start: 2019-03-11 | End: 2019-03-11 | Stop reason: HOSPADM

## 2019-03-11 RX ORDER — IOPAMIDOL 408 MG/ML
10 INJECTION, SOLUTION INTRATHECAL ONCE
Status: COMPLETED | OUTPATIENT
Start: 2019-03-11 | End: 2019-03-11

## 2019-03-11 RX ORDER — LIDOCAINE HYDROCHLORIDE 10 MG/ML
30 INJECTION, SOLUTION EPIDURAL; INFILTRATION; INTRACAUDAL; PERINEURAL ONCE
Status: COMPLETED | OUTPATIENT
Start: 2019-03-11 | End: 2019-03-11

## 2019-03-11 RX ADMIN — LIDOCAINE HYDROCHLORIDE 3 ML: 10 INJECTION, SOLUTION EPIDURAL; INFILTRATION; INTRACAUDAL; PERINEURAL at 16:11

## 2019-03-11 RX ADMIN — DEXAMETHASONE SODIUM PHOSPHATE 10 MG: 10 INJECTION, SOLUTION INTRAMUSCULAR; INTRAVENOUS at 16:14

## 2019-03-11 RX ADMIN — IOPAMIDOL 1 ML: 408 INJECTION, SOLUTION INTRATHECAL at 16:10

## 2019-03-11 RX ADMIN — DEXAMETHASONE SODIUM PHOSPHATE 10 MG: 10 INJECTION, SOLUTION INTRAMUSCULAR; INTRAVENOUS at 16:13

## 2019-03-11 ASSESSMENT — MIFFLIN-ST. JEOR: SCORE: 1694.64

## 2019-03-11 NOTE — IP AVS SNAPSHOT
[place] [address] [place] [address]  Phone:  [phone]                                    After Visit Summary   3/11/2019    Tristian Magaña    MRN: [id number]           After Visit Summary Signature Page    I have received my discharge instructions, and my questions have been answered. I have discussed any challenges I see with this plan with the nurse or doctor.    ..........................................................................................................................................  Patient/Patient Representative Signature      ..........................................................................................................................................  Patient Representative Print Name and Relationship to Patient    ..................................................               ................................................  Date                                   Time    ..........................................................................................................................................  Reviewed by Signature/Title    ...................................................              ..............................................  Date                                               Time          22EPIC Rev 08/18

## 2019-03-11 NOTE — IP AVS SNAPSHOT
MRN:[id number]                      After Visit Summary   3/11/2019    Tristian Magaña    MRN: [id number]           Visit Information        Department      3/11/2019  2:21 PM [facility]          Review of your medicines      UNREVIEWED medicines. Ask your doctor about these medicines       Dose / Directions   fish oil-omega-3 fatty acids 1000 MG capsule      Dose:  2 g  Take 2 g by mouth daily  Refills:  0     ibuprofen 600 MG tablet  Commonly known as:  ADVIL/MOTRIN      Dose:  600 mg  Take 600 mg by mouth every 6 hours as needed for moderate pain  Refills:  0     MULTIVITAMIN ADULT PO      Take by mouth daily  Refills:  0              Protect others around you: Learn how to safely use, store and throw away your medicines at www.disposemymeds.org.       Follow-ups after your visit       Your next 10 appointments already scheduled    Mar 11, 2019  3:00 PM CDT  (Arrive by 2:15 PM)  XR CERVICAL/THORACIC TRANSFORAMINAL INJ LEFT with SHXR4, SH MSK RAD  [facility] Radiology ([facility]) [address] [place] [id number] [phone]   How do I prepare for my exam? (Food and drink instructions) Stop all food and drink (including water) 3 hours before your test or treatment.  How do I prepare for my exam? (Other instructions) Stop taking the following medicines (but talk to your doctor first): * If you take blood thinners, you may need to stop taking them a few days before treatment. Talk to your doctor before stopping these medicines. Stop taking Coumadin (warfarin) 3 days before treatment. Restart the day after treatment. * If you take Plavix, Ticlid, Pletal or Persantine, please ask your doctor if you should stop these medicines. You may need extra tests on the morning of your scan. * If you take Xarelto (Rivaroxaban), you may need to stop taking it 24 hours before treatment. Talk to your doctor before stopping this medicine. Restart the day after  treatment.  You may take your other medicines as normal.  What should I wear: Wear comfortable clothes.  How long does the exam take: Injections take about 30 to 45 minutes. Most people spend up to 2 hours in the clinic or hospital.  What should I bring: For nerve root injection, please send or bring copies of any MRIs or other scans you have had. Please bring a list of your current medicines to your exam. Include vitamins, minerals and over-the-counter medicines.  Do I need a :  Plan to have someone drive you home afterward.  What do I need to tell my doctor: Tell your doctor if: * You have ever had an allergic reaction to X-ray dye (contrast fluid). * If there s any chance you are pregnant.  What should I do after the exam: We may ask you to lie down for a short time before you go home. If you had a nerve shot and your arm or leg feels numb, you will stay for up to two hours until the numbness wears off. You may return to your normal activities the next day.  What is this test: A spinal shot is done in or near the spine. You may receive steroid medicine (to reduce swelling) or contrast fluid (dye that makes the area show up more clearly on X-rays).  Who should I call with questions: If you have any questions, please call the Imaging Department where you will have your exam. Directions, parking instructions, and other information is available on our website, [email or web address].      Care Instructions       Further instructions from your care team       Steroid Injection Discharge Instructions     After you go home:      You may resume your normal diet.    Care of Puncture Site:      If you have a bandaid on your puncture site, you may remove it the next morning    You may shower tomorrow    No bath tubs, whirlpools or swimming for at least 3 days     Activity:      You may go back to normal activity in 24 hours    You should let pain be your guide as to the extent of your activities    Maintain any activity  limitations as ordered by your provider    Do NOT drive a vehicle until tomorrow    Medicines:      You may resume all medications    Resume your Warfarin/Coumadin at your regular dose today. Follow up with your provider to have your INR rechecked    Resume your Platelet Inhibitors and Aspirin tomorrow at your regular dose    For minor pain, you may take Acetaminophen (Tylenol) or Ibuprofen (Advil)    Pain:       You may experience increased or different pain over the next 24-48 hours    For the next 48 hrs - you may use ice packs for discomfort     Call your primary care doctor if:      You have severe pain that does not improve with pain medication    You have chills or a fever greater than 101 F (38 C)    The site is red, swollen, hot or tender    New problems with your bowel or bladder    Any questions or concerns    Other Instructions:      New numbness down your leg post injection is temporary and may last for up to 6 hours. You may need assistance with activity until your leg has normal sensation.    If you are diabetic, monitor your blood sugar closely. Contact the provider who manages your diabetes to help you control your blood sugar if needed.    For Your Information:      A steroid was injected to help decrease swelling and may help to reduce pain. It may take up to 7-10 days to obtain full results.    Some patients will get lasting relief from a single injection. Others may require up to 3 injections to get results. If you have more than one steroid injection, they should be given 2 weeks apart.    Side effects of your steroid injection are mild and will go away in 2-3 days  - Insomnia  - Heartburn  - Flushed face  - Water retention  - Increased appetite  - Increased blood sugar      If you have questions call:        [facility] Radiology Dept @ [phone]      The provider who performed your procedure was __Dr. Coppola_______________.        Additional Information About Your Visit        [facility] Information    [facility] gives you secure access to your electronic health record. If you see a primary care provider, you can also send messages to your care team and make appointments. If you have questions, please call your primary care clinic.  If you do not have a primary care provider, please call [phone] and they will assist you.       Care EveryWhere ID    This is your Care EveryWhere ID. This could be used by other organizations to access your [facility] medical records  [id number]        Primary Care Provider Fax #    Physician No Ref-Primary [phone]      Equal Access to Services    [facility]: Hadii gayle Lay, wachristin duong, qanohemy juarezaloseas sol, my morrow . So [facility] [phone].    ATENCIÓN: Si habla español, tiene a raines disposición servicios gratuitos de asistencia lingüística. Llame al [phone].    We comply with applicable federal civil rights laws and [place] laws. We do not discriminate on the basis of race, color, national origin, age, disability, sex, sexual orientation, or gender identity.           Thank you!    Thank you for choosing [facility] for your care. Our goal is always to provide you with excellent care. Hearing back from our patients is one way we can continue to improve our services. Please take a few minutes to complete the written survey that you may receive in the mail after you visit with us. Thank you!            Medication List      ASK your doctor about these medications          Morning Afternoon Evening Bedtime As Needed    fish oil-omega-3 fatty acids 1000 MG capsule  INSTRUCTIONS:  Take 2 g by mouth daily                     ibuprofen 600 MG tablet  Also known as:  ADVIL/MOTRIN  INSTRUCTIONS:  Take 600 mg by mouth every 6 hours as needed for moderate pain                     MULTIVITAMIN ADULT PO  INSTRUCTIONS:  Take by mouth daily

## 2019-03-11 NOTE — PROGRESS NOTES
RADIOLOGY PROCEDURE NOTE  Patient name: Tristian Magaña  MRN: [id number]  : 1980    Pre-procedure diagnosis: Left arm pain  Post-procedure diagnosis: Same    Procedure Date/Time: 2019  4:08 PM  Procedure: Left C5-C6 Cervical nerve root block  Estimated blood loss: None  Specimen(s) collected with description: none  The patient tolerated the procedure well with no immediate complications.  Significant findings:none    See imaging dictation for procedural details.    Provider name: Chava Cabezas  Assistant(s):None

## 2019-03-11 NOTE — DISCHARGE INSTRUCTIONS
Steroid Injection Discharge Instructions     After you go home:      You may resume your normal diet.    Care of Puncture Site:      If you have a bandaid on your puncture site, you may remove it the next morning    You may shower tomorrow    No bath tubs, whirlpools or swimming for at least 3 days     Activity:      You may go back to normal activity in 24 hours    You should let pain be your guide as to the extent of your activities    Maintain any activity limitations as ordered by your provider    Do NOT drive a vehicle until tomorrow    Medicines:      You may resume all medications    Resume your Warfarin/Coumadin at your regular dose today. Follow up with your provider to have your INR rechecked    Resume your Platelet Inhibitors and Aspirin tomorrow at your regular dose    For minor pain, you may take Acetaminophen (Tylenol) or Ibuprofen (Advil)    Pain:       You may experience increased or different pain over the next 24-48 hours    For the next 48 hrs - you may use ice packs for discomfort     Call your primary care doctor if:      You have severe pain that does not improve with pain medication    You have chills or a fever greater than 101 F (38 C)    The site is red, swollen, hot or tender    New problems with your bowel or bladder    Any questions or concerns    Other Instructions:      New numbness down your leg post injection is temporary and may last for up to 6 hours. You may need assistance with activity until your leg has normal sensation.    If you are diabetic, monitor your blood sugar closely. Contact the provider who manages your diabetes to help you control your blood sugar if needed.    For Your Information:      A steroid was injected to help decrease swelling and may help to reduce pain. It may take up to 7-10 days to obtain full results.    Some patients will get lasting relief from a single injection. Others may require up to 3 injections to get results. If you have more than one  steroid injection, they should be given 2 weeks apart.    Side effects of your steroid injection are mild and will go away in 2-3 days  - Insomnia  - Heartburn  - Flushed face  - Water retention  - Increased appetite  - Increased blood sugar      If you have questions call:        [facility] Radiology Dept @ [phone]      The provider who performed your procedure was __Dr. Coppola_______________.

## 2019-03-11 NOTE — PROGRESS NOTES
VS stable post procedure, denies any new discomfort.  Epidural site CDI.  Pt is taking po fluids well and ready to be discharged. Reinforce discharge instruction.

## 2019-03-11 NOTE — PROGRESS NOTES
1615 Returned from xray per cart post cervical nerve root injection. Denies complaints, offered po fluids and crackers. VSS.

## 2019-05-14 ENCOUNTER — OFFICE VISIT (OUTPATIENT)
Dept: ORTHOPEDICS | Facility: CLINIC | Age: 39
End: 2019-05-14
Payer: OTHER GOVERNMENT

## 2019-05-14 VITALS — BODY MASS INDEX: 26.36 KG/M2 | WEIGHT: 178 LBS | HEIGHT: 69 IN

## 2019-05-14 DIAGNOSIS — M54.12 CERVICAL RADICULOPATHY: Primary | ICD-10-CM

## 2019-05-14 ASSESSMENT — MIFFLIN-ST. JEOR: SCORE: 1712.78

## 2019-05-14 NOTE — LETTER
5/14/2019      RE: Tristian Magaña  [address] [facility] [address]        Subjective:   Tristian Magaña is a 39 year old male who follows up with neck and left arm pain. He states that he has had two cervical injections with radiology. He had no relief from the first injection on 2/8/19. He got about 2 weeks of relief from the second injection on 3/11/19.  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  He states it feels like a burning and tingling sensation.  Most of it is isolated in the region of the left shoulder now.  He does recount that he had excellent relief of his symptoms for 2 weeks following his second transforaminal epidural steroid injection.  He is having no upper extremity muscle weakness.  He notes that the paresthesias and dysesthesias are bothersome enough that he like to continue to address them and try and resolve them.  He is trying to avoid surgical intervention if possible.    Date of injury: NA  Date last seen: 2/7/2019  Following Therapeutic Plan: Yes   Pain: Worsening  Function: Worsening  Interval History:      PAST MEDICAL, SOCIAL, SURGICAL AND FAMILY HISTORY: He  has no past medical history on file.  He  has a past surgical history that includes no history of surgery.  His family history includes Dementia in his father; Diabetes in his father; No Known Problems in his brother, mother, and sister; Parkinsonism in his father; Seizure Disorder in his father.  He reports that he has never smoked. He has never used smokeless tobacco. He reports that he drinks about 3.6 oz of alcohol per week. He reports that he does not use drugs.    ALLERGIES: He is allergic to penicillins.    CURRENT MEDICATIONS: He has a current medication list which includes the following prescription(s): fish oil-omega-3 fatty acids, ibuprofen, and multiple vitamins-minerals.     REVIEW OF SYSTEMS: 10 point review of systems is negative except as noted  "above.     Exam:   Ht 1.753 m (5' 9\")   Wt 80.7 kg (178 lb)   BMI 26.29 kg/m         CONSTITUTIONAL: healthy, alert and no distress  HEAD: Normocephalic. No masses, lesions, tenderness or abnormalities  SKIN: no suspicious lesions or rashes  GAIT: normal  NEUROLOGIC: Non-focal  PSYCHIATRIC: affect normal/bright and mentation appears normal.    MUSCULOSKELETAL:   Cervical spine: He is nontender over the cervical spine itself.  Manual motor testing of the bilateral upper extremities demonstrates 5 out of 5 strength which is symmetric.  Sensation to light touch is intact over the bilateral upper extremities.       Assessment/Plan:   Tristian is a 39-year-old male with a C5-C6 disc with some encroachment into the left neural foramina.  He has some sequestration of the disc which suggests it is more likely to be surgical.  He had no relief with his first transforaminal epidural steroid injection but did get nice relief albeit short-lived with his second injection.  We are going to have him start physical therapy and will give a trial of cervical traction to see if this is beneficial for him.  After his second physical therapy appointment if he is not having significant benefit he will return for a left C5-C6 transforaminal epidural steroid injection but I would like that to be done at our facility.  I am also going to have him get scheduled to see Dr. Flaca Raymundo to discuss future surgical considerations.    Fermin Hernandez MD    "

## 2019-05-14 NOTE — PROGRESS NOTES
" Subjective:   Tristian Magaañ is a 39 year old male who follows up with neck and left arm pain. He states that he has had two cervical injections with radiology. He had no relief from the first injection on 2/8/19. He got about 2 weeks of relief from the second injection on 3/11/19.  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  He states it feels like a burning and tingling sensation.  Most of it is isolated in the region of the left shoulder now.  He does recount that he had excellent relief of his symptoms for 2 weeks following his second transforaminal epidural steroid injection.  He is having no upper extremity muscle weakness.  He notes that the paresthesias and dysesthesias are bothersome enough that he like to continue to address them and try and resolve them.  He is trying to avoid surgical intervention if possible.    Date of injury: NA  Date last seen: 2/7/2019  Following Therapeutic Plan: Yes   Pain: Worsening  Function: Worsening  Interval History:      PAST MEDICAL, SOCIAL, SURGICAL AND FAMILY HISTORY: He  has no past medical history on file.  He  has a past surgical history that includes no history of surgery.  His family history includes Dementia in his father; Diabetes in his father; No Known Problems in his brother, mother, and sister; Parkinsonism in his father; Seizure Disorder in his father.  He reports that he has never smoked. He has never used smokeless tobacco. He reports that he drinks about 3.6 oz of alcohol per week. He reports that he does not use drugs.    ALLERGIES: He is allergic to penicillins.    CURRENT MEDICATIONS: He has a current medication list which includes the following prescription(s): fish oil-omega-3 fatty acids, ibuprofen, and multiple vitamins-minerals.     REVIEW OF SYSTEMS: 10 point review of systems is negative except as noted above.     Exam:   Ht 1.753 m (5' 9\")   Wt 80.7 kg (178 lb)   BMI 26.29 kg/m      "   CONSTITUTIONAL: healthy, alert and no distress  HEAD: Normocephalic. No masses, lesions, tenderness or abnormalities  SKIN: no suspicious lesions or rashes  GAIT: normal  NEUROLOGIC: Non-focal  PSYCHIATRIC: affect normal/bright and mentation appears normal.    MUSCULOSKELETAL:   Cervical spine: He is nontender over the cervical spine itself.  Manual motor testing of the bilateral upper extremities demonstrates 5 out of 5 strength which is symmetric.  Sensation to light touch is intact over the bilateral upper extremities.       Assessment/Plan:   Tristian is a 39-year-old male with a C5-C6 disc with some encroachment into the left neural foramina.  He has some sequestration of the disc which suggests it is more likely to be surgical.  He had no relief with his first transforaminal epidural steroid injection but did get nice relief albeit short-lived with his second injection.  We are going to have him start physical therapy and will give a trial of cervical traction to see if this is beneficial for him.  After his second physical therapy appointment if he is not having significant benefit he will return for a left C5-C6 transforaminal epidural steroid injection but I would like that to be done at our facility.  I am also going to have him get scheduled to see Dr. Flaca Raymundo to discuss future surgical considerations.

## 2019-05-22 ENCOUNTER — THERAPY VISIT (OUTPATIENT)
Dept: PHYSICAL THERAPY | Facility: CLINIC | Age: 39
End: 2019-05-22
Payer: OTHER GOVERNMENT

## 2019-05-22 DIAGNOSIS — M54.12 CERVICAL RADICULOPATHY: ICD-10-CM

## 2019-05-22 PROCEDURE — 97140 MANUAL THERAPY 1/> REGIONS: CPT | Mod: GP | Performed by: PHYSICAL THERAPIST

## 2019-05-22 PROCEDURE — 97161 PT EVAL LOW COMPLEX 20 MIN: CPT | Mod: GP | Performed by: PHYSICAL THERAPIST

## 2019-05-22 NOTE — PROGRESS NOTES
[facility] Initial Evaluation  Subjective:  The history is provided by the patient. No  was used.   Tristian Magaña is a 39 year old male with a cervical spine (October 2018) condition.  Condition occurred with:  Insidious onset.  Condition occurred: for unknown reasons.  This is a chronic condition     Site of Pain: left neck   Radiates to:  None.  Pain is described as aching and is intermittent Pain Scale: 2-3/10   Associated symptoms:  Numbness and tingling ( left inside upper arm, all fingers left ). Worse during: none.  Symptoms are exacerbated by driving and rotating head (looking up ) Relieved by: neck neutral.  Since onset symptoms are unchanged.  Special tests:  MRI (Multilevel cervical spondylosis most prominent at C5-6 with moderate).  Previous treatment: none.  Improvement with previous treatment: na.  General health as reported by patient is good.                                              Objective:  System              Cervical/Thoracic Evaluation    AROM:  AROM Cervical:    Flexion:            WNL  Extension:       70% with left neck pain   Rotation:         Left: 80%      Right: 85% with left neck pain   Side Bend:      Left: 50%      Right:  50%      Headaches: none  Cervical Myotomes:    C1-2 (Neck Flex): Right: 5  C3 (neck side bend): Right: 5  C4 (shrug):  Left: 5    Right: 5  C5 (Deltoid):  Left: 5-    Right: 5  C6 (Biceps):  Left: 5-    Right: 5  C7 (Triceps):  Left: 5-    Right: 5  C8 (Thumb Ext): Left: 5    Right: 5  T1 (Intrinsics): Left: 5    Right: 5  DTR's:    C5 (Biceps):  Left:  2  Right:  2     C6 (Brachioradialis):  Left:  3  Right:  3     C7 (Triceps):  Left:  2  Right:  2              Cord Sign:  not assessed                                          Manual cervical traction decreases symptoms. Left greater than right upper trapezius and levator scapulae muscle tightness with palpation, moderate decrese in upper thoracic to mi thoracic mobility    General     ROS    Assessment/Plan:    Patient is a 39 year old male with cervical complaints.    Patient has the following significant findings with corresponding treatment plan.                Diagnosis 1:  Cervical radiculopathy   Pain -  hot/cold therapy, manual therapy, self management, education, directional preference exercise and home program  Decreased ROM/flexibility - manual therapy, therapeutic exercise, therapeutic activity and home program  Decreased strength - therapeutic exercise, therapeutic activities and home program  Impaired muscle performance - neuro re-education and home program  Decreased function - therapeutic activities and home program    Therapy Evaluation Codes:   1) Decision-Making    Low complexity using standardized patient assessment instrument and/or measureable assessment of functional outcome.  Cumulative Therapy Evaluation is: Low complexity.    Previous and current functional limitations:  (See Goal Flow Sheet for this information)    Short term and Long term goals: (See Goal Flow Sheet for this information)     Communication ability:  Patient appears to be able to clearly communicate and understand verbal and written communication and follow directions correctly.  Treatment Explanation - The following has been discussed with the patient:   RX ordered/plan of care  Anticipated outcomes  Possible risks and side effects  This patient would benefit from PT intervention to resume normal activities.   Rehab potential is good.    Frequency:  1 X week, once daily  Duration:  for 6 weeks  Discharge Plan:  Achieve all LTG.  Independent in home treatment program.  Reach maximal therapeutic benefit.    Please refer to the daily flowsheet for treatment today, total treatment time and time spent performing 1:1 timed codes.

## 2019-05-23 PROBLEM — M54.12 CERVICAL RADICULOPATHY: Status: ACTIVE | Noted: 2019-05-23

## 2019-05-28 NOTE — PROGRESS NOTES
[facility] Initial Evaluation  Subjective:                                         Medical allergies: Penicillin       Current occupation  .                                    Objective:  System    Physical Exam    General     ROS    Assessment/Plan:

## 2019-05-31 ENCOUNTER — THERAPY VISIT (OUTPATIENT)
Dept: PHYSICAL THERAPY | Facility: CLINIC | Age: 39
End: 2019-05-31
Payer: OTHER GOVERNMENT

## 2019-05-31 DIAGNOSIS — M54.12 CERVICAL RADICULOPATHY: ICD-10-CM

## 2019-05-31 PROCEDURE — 97140 MANUAL THERAPY 1/> REGIONS: CPT | Mod: GP | Performed by: PHYSICAL THERAPIST

## 2019-05-31 PROCEDURE — 97110 THERAPEUTIC EXERCISES: CPT | Mod: GP | Performed by: PHYSICAL THERAPIST

## 2019-05-31 PROCEDURE — 97112 NEUROMUSCULAR REEDUCATION: CPT | Mod: GP | Performed by: PHYSICAL THERAPIST

## 2019-06-12 ENCOUNTER — THERAPY VISIT (OUTPATIENT)
Dept: PHYSICAL THERAPY | Facility: CLINIC | Age: 39
End: 2019-06-12
Payer: OTHER GOVERNMENT

## 2019-06-12 DIAGNOSIS — M54.12 CERVICAL RADICULOPATHY: ICD-10-CM

## 2019-06-12 PROCEDURE — 97140 MANUAL THERAPY 1/> REGIONS: CPT | Mod: GP | Performed by: PHYSICAL THERAPIST

## 2019-06-12 PROCEDURE — 97110 THERAPEUTIC EXERCISES: CPT | Mod: GP | Performed by: PHYSICAL THERAPIST

## 2019-06-26 ENCOUNTER — THERAPY VISIT (OUTPATIENT)
Dept: PHYSICAL THERAPY | Facility: CLINIC | Age: 39
End: 2019-06-26
Payer: OTHER GOVERNMENT

## 2019-06-26 DIAGNOSIS — M54.12 CERVICAL RADICULOPATHY: ICD-10-CM

## 2019-06-26 PROCEDURE — 97112 NEUROMUSCULAR REEDUCATION: CPT | Mod: GP | Performed by: PHYSICAL THERAPIST

## 2019-06-26 PROCEDURE — 97530 THERAPEUTIC ACTIVITIES: CPT | Mod: GP | Performed by: PHYSICAL THERAPIST

## 2019-06-26 PROCEDURE — 97012 MECHANICAL TRACTION THERAPY: CPT | Mod: GP | Performed by: PHYSICAL THERAPIST

## 2019-07-16 ENCOUNTER — THERAPY VISIT (OUTPATIENT)
Dept: PHYSICAL THERAPY | Facility: CLINIC | Age: 39
End: 2019-07-16
Payer: OTHER GOVERNMENT

## 2019-07-16 DIAGNOSIS — M54.12 CERVICAL RADICULOPATHY: ICD-10-CM

## 2019-07-16 PROCEDURE — 97110 THERAPEUTIC EXERCISES: CPT | Mod: GP | Performed by: PHYSICAL THERAPIST

## 2019-07-16 PROCEDURE — 97140 MANUAL THERAPY 1/> REGIONS: CPT | Mod: GP | Performed by: PHYSICAL THERAPIST

## 2019-07-24 ENCOUNTER — THERAPY VISIT (OUTPATIENT)
Dept: PHYSICAL THERAPY | Facility: CLINIC | Age: 39
End: 2019-07-24
Payer: OTHER GOVERNMENT

## 2019-07-24 DIAGNOSIS — M54.12 CERVICAL RADICULOPATHY: ICD-10-CM

## 2019-07-24 PROCEDURE — 97012 MECHANICAL TRACTION THERAPY: CPT | Mod: GP | Performed by: PHYSICAL THERAPIST

## 2019-07-24 PROCEDURE — 97110 THERAPEUTIC EXERCISES: CPT | Mod: GP | Performed by: PHYSICAL THERAPIST

## 2019-07-24 NOTE — Clinical Note
Tristian Baig responded well to course of treatment and trial on home cervical unit. He does not feel like he needs unit at this time due to his improvement and will be on assignment in [employer] starting in September for 6 months. See discharge summary for details and told him to touch base with you if he needs referral for home cervical traction unit. Renee

## 2019-07-24 NOTE — PROGRESS NOTES
Subjective:  HPI                    Objective:  System    Physical Exam    General     [facility]    Assessment/Plan:    DISCHARGE REPORT    Progress reporting period is from 5- to 7-.       SUBJECTIVE  Subjective changes noted by patient:  Overall improvement has been significant and has no major complaints. He is sleeping thru night and driving pain free. Tristian feels comfortable continuing with independent home program to self manage symptoms.       Current pain level is 0/10.     Previous pain level was  2-3/10  Changes in function:  Yes (See Goal flowsheet attached for changes in current functional level)  Adverse reaction to treatment or activity: None    OBJECTIVE  Changes noted in objective findings:  Cervical/Thoracic Evaluation     AROM:  AROM Cervical:     Flexion:            WNL  Extension:       WNL   Rotation:         Left: 90%      Right: 90%     Side Bend:      Left: 50%      Right:  50%        Headaches: none  Cervical Myotomes:    C1-2 (Neck Flex): Right: 5  C3 (neck side bend): Right: 5  C4 (shrug):  Left: 5    Right: 5  C5 (Deltoid):  Left: 5    Right: 5  C6 (Biceps):  Left: 5    Right: 5  C7 (Triceps):  Left: 5    Right: 5  C8 (Thumb Ext): Left: 5    Right: 5  T1 (Intrinsics): Left: 5    Right: 5  DTR's:    C5 (Biceps):  Left:  2  Right:  2     C6 (Brachioradialis):  Left:  3  Right:  3     C7 (Triceps):  Left:  2  Right:  2                    Cord Sign:  not assessed                                             Manual cervical traction decreases symptoms. Left greater than right upper trapezius and levator scapulae muscle tightness with palpation, moderate decrese in upper thoracic to mi thoracic mobility   General      ROS     Assessment/Plan:    Patient is a 39 year old male with cervical complaints.    Patient has the following significant findings with corresponding treatment plan.                       ASSESSMENT/PLAN  Updated problem list and treatment plan: Cervical radiculopathy    Pain -  hot/cold therapy, manual therapy, self management, education, directional preference exercise and home program  Decreased ROM/flexibility - manual therapy, therapeutic exercise, therapeutic activity and home program  Decreased strength - therapeutic exercise, therapeutic activities and home program  Impaired muscle performance - neuro re-education and home program  Decreased function - therapeutic activities and home program    STG/LTGs have been met or progress has been made towards goals:  Yes (See Goal flow sheet completed today.)  Assessment of Progress: The patient has met all of their long term goals.  Self Management Plans:  Patient has been instructed in a home treatment program.  Patient is independent in a home treatment program.  Patient  has been instructed in self management of symptoms.  Patient is independent in self management of symptoms.  I have re-evaluated this patient and find that the nature, scope, duration and intensity of the therapy is appropriate for the medical condition of the patient.  Tristian continues to require the following intervention to meet STG and LTG's:  PT intervention is no longer required to meet STG/LTG.    Recommendations:  This patient is ready to be discharged from therapy and continue their home treatment program.He does not feel he needs home cervical traction unit at this time and will contact Dr. Hernandez in the future if he needs one.     Please refer to the daily flowsheet for treatment today, total treatment time and time spent performing 1:1 timed codes.

## 2019-07-25 PROBLEM — M54.12 CERVICAL RADICULOPATHY: Status: RESOLVED | Noted: 2019-05-23 | Resolved: 2019-07-25

## 2020-08-26 ENCOUNTER — OFFICE VISIT (OUTPATIENT)
Dept: FAMILY MEDICINE | Facility: CLINIC | Age: 40
End: 2020-08-26
Payer: OTHER GOVERNMENT

## 2020-08-26 VITALS
RESPIRATION RATE: 16 BRPM | BODY MASS INDEX: 28.73 KG/M2 | OXYGEN SATURATION: 97 % | HEART RATE: 80 BPM | HEIGHT: 69 IN | DIASTOLIC BLOOD PRESSURE: 82 MMHG | SYSTOLIC BLOOD PRESSURE: 122 MMHG | TEMPERATURE: 98.2 F | WEIGHT: 194 LBS

## 2020-08-26 DIAGNOSIS — H93.13 EAR RINGING SOUND, BILATERAL: Primary | ICD-10-CM

## 2020-08-26 DIAGNOSIS — H69.93 DYSFUNCTION OF BOTH EUSTACHIAN TUBES: ICD-10-CM

## 2020-08-26 DIAGNOSIS — H93.8X3 EAR PRESSURE, BILATERAL: ICD-10-CM

## 2020-08-26 ASSESSMENT — ENCOUNTER SYMPTOMS
MUSCLE CRAMPS: 0
DISTURBANCES IN COORDINATION: 0
SINUS CONGESTION: 0
MYALGIAS: 0
NECK PAIN: 0
DEPRESSION: 1
MUSCLE WEAKNESS: 0
JOINT SWELLING: 0
SINUS PAIN: 1
DIZZINESS: 0
FEVER: 0
BACK PAIN: 0
HEADACHES: 0
EYE IRRITATION: 1
TINGLING: 0
EXTREMITY NUMBNESS: 0
FATIGUE: 0
CHILLS: 0
COUGH: 1
STIFFNESS: 0
ARTHRALGIAS: 0

## 2020-08-26 ASSESSMENT — MIFFLIN-ST. JEOR: SCORE: 1780.36

## 2020-08-26 ASSESSMENT — ANXIETY QUESTIONNAIRES
3. WORRYING TOO MUCH ABOUT DIFFERENT THINGS: NOT AT ALL
6. BECOMING EASILY ANNOYED OR IRRITABLE: NOT AT ALL
GAD7 TOTAL SCORE: 0
2. NOT BEING ABLE TO STOP OR CONTROL WORRYING: NOT AT ALL
5. BEING SO RESTLESS THAT IT IS HARD TO SIT STILL: NOT AT ALL
7. FEELING AFRAID AS IF SOMETHING AWFUL MIGHT HAPPEN: NOT AT ALL
1. FEELING NERVOUS, ANXIOUS, OR ON EDGE: NOT AT ALL
IF YOU CHECKED OFF ANY PROBLEMS ON THIS QUESTIONNAIRE, HOW DIFFICULT HAVE THESE PROBLEMS MADE IT FOR YOU TO DO YOUR WORK, TAKE CARE OF THINGS AT HOME, OR GET ALONG WITH OTHER PEOPLE: NOT DIFFICULT AT ALL

## 2020-08-26 ASSESSMENT — PATIENT HEALTH QUESTIONNAIRE - PHQ9
5. POOR APPETITE OR OVEREATING: NOT AT ALL
SUM OF ALL RESPONSES TO PHQ QUESTIONS 1-9: 0

## 2020-08-26 NOTE — PATIENT INSTRUCTIONS
First, try an antihistamine such as Allegra, Zyrtec or Claritin daily. Next, use Debrox drops (2 drops in both ears monthly to prevent wax build up). Next, consider Flonase nasal spray to help open ear canals when they feel plugged. Next, drink 100 oz water daily. Next, consider taking 2,000 IUS Vit d daily. Next, follow up in the next month for a physical exam. Please come to Physical fasting which means nothing to eat for 8 hours, you may drink water. Thank you.   [facility] Medication Refill Request Information:  * Please contact your pharmacy regarding ANY request for medication refills.  ** [facility] Clinic Prescription Fax = [phone]  * Please allow 3 business days for routine medication refills.  * Please allow 5 business days for controlled substance medication refills.     [facility] Test Result notification information:  *You will be notified with in 7-10 days of your appointment day regarding the results of your test.  If you are on MyChart you will be notified as soon as the provider has reviewed the results and signed off on them.    [facility]: [phone]

## 2020-08-26 NOTE — NURSING NOTE
"40 year old  Chief Complaint   Patient presents with     Ear Problem     Pt. presents to the clinic today to establish care and for ringing in bilateral ears       Blood pressure 122/82, pulse 80, temperature 98.2  F (36.8  C), temperature source Oral, resp. rate 16, height 1.753 m (5' 9\"), weight 88 kg (194 lb), SpO2 97 %. Body mass index is 28.65 kg/m .  BP completed using cuff size:    Patient Active Problem List   Diagnosis   (none) - all problems resolved or deleted       Wt Readings from Last 2 Encounters:   08/26/20 88 kg (194 lb)   05/14/19 80.7 kg (178 lb)     BP Readings from Last 3 Encounters:   08/26/20 122/82   03/11/19 129/87   02/08/19 117/77       Allergies   Allergen Reactions     Penicillins        Current Outpatient Medications   Medication     fish oil-omega-3 fatty acids 1000 MG capsule     ibuprofen (ADVIL/MOTRIN) 600 MG tablet     Multiple Vitamins-Minerals (MULTIVITAMIN ADULT PO)     No current facility-administered medications for this visit.        Social History     Tobacco Use     Smoking status: Never Smoker     Smokeless tobacco: Never Used   Substance Use Topics     Alcohol use: Yes     Alcohol/week: 6.0 standard drinks     Types: 6 Cans of beer per week     Drug use: No       Honoring Choices - Health Care Directive Guide offered to patient at time of visit.    Health Maintenance Due   Topic Date Due     PREVENTIVE CARE VISIT  1980     HIV SCREENING  03/09/1995     DTAP/TDAP/TD IMMUNIZATION (1 - Tdap) 03/09/2005     LIPID  03/09/2015     PHQ-2  01/01/2020     INFLUENZA VACCINE (1) 09/01/2020         There is no immunization history on file for this patient.    No results found for: PAP      No lab results found.    PHQ-2 ( 1999 Pf[employer]) 8/26/2020 5/14/2019   Q1: Little interest or pleasure in doing things 0 0   Q2: Feeling down, depressed or hopeless 0 0   PHQ-2 Score 0 0       PHQ-9 SCORE 6/13/2018 8/26/2020   PHQ-9 Total Score 0 0       DOMENICO-7 SCORE 6/13/2018 8/26/2020   Total " Score 0 0       No flowsheet data found.    bilateral ear lavage done.  Small amount of cerumen irrigated using luke warm water and hydrogen peroxide without incident. Patient tolerated procedure well.       Rody Cho CMA  August 26, 2020 4:15 PM
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
## 2020-08-26 NOTE — PROGRESS NOTES
r side worse than left. Ringing in ears, no pain, slight pressure. A couple of weeks. No changes in hearing, no vision changes, no dizziness/falls.     Thinks it's dust related.   Wellbutrin: depression. Lowest dose daily. [facility] in [place]     1-2L h2o/day  6-8 hours sleep, feels rested.

## 2020-08-27 ASSESSMENT — ANXIETY QUESTIONNAIRES: GAD7 TOTAL SCORE: 0

## 2021-02-16 ENCOUNTER — OFFICE VISIT (OUTPATIENT)
Dept: FAMILY MEDICINE | Facility: CLINIC | Age: 41
End: 2021-02-16
Payer: OTHER GOVERNMENT

## 2021-02-16 VITALS
DIASTOLIC BLOOD PRESSURE: 70 MMHG | SYSTOLIC BLOOD PRESSURE: 120 MMHG | BODY MASS INDEX: 27.47 KG/M2 | OXYGEN SATURATION: 96 % | WEIGHT: 186 LBS | HEART RATE: 120 BPM | TEMPERATURE: 98.7 F

## 2021-02-16 DIAGNOSIS — R35.0 FREQUENT URINATION: Primary | ICD-10-CM

## 2021-02-16 DIAGNOSIS — F32.1 MODERATE MAJOR DEPRESSION (H): ICD-10-CM

## 2021-02-16 DIAGNOSIS — F41.1 GAD (GENERALIZED ANXIETY DISORDER): ICD-10-CM

## 2021-02-16 DIAGNOSIS — R35.0 FREQUENT URINATION: ICD-10-CM

## 2021-02-16 DIAGNOSIS — F43.10 PTSD (POST-TRAUMATIC STRESS DISORDER): ICD-10-CM

## 2021-02-16 LAB
ALBUMIN UR-MCNC: NEGATIVE MG/DL
APPEARANCE UR: CLEAR
BACTERIA #/AREA URNS HPF: ABNORMAL /HPF
BILIRUB UR QL STRIP: NEGATIVE
COLOR UR AUTO: YELLOW
GLUCOSE UR STRIP-MCNC: NEGATIVE MG/DL
HBA1C MFR BLD: 5.6 % (ref 0–5.6)
HGB UR QL STRIP: NEGATIVE
KETONES UR STRIP-MCNC: NEGATIVE MG/DL
LEUKOCYTE ESTERASE UR QL STRIP: NEGATIVE
MUCOUS THREADS #/AREA URNS LPF: PRESENT /LPF
NITRATE UR QL: NEGATIVE
PH UR STRIP: 6 PH (ref 5–7)
RBC #/AREA URNS AUTO: ABNORMAL /HPF
SOURCE: ABNORMAL
SP GR UR STRIP: 1.02 (ref 1–1.03)
UROBILINOGEN UR STRIP-ACNC: 0.2 EU/DL (ref 0.2–1)
WBC #/AREA URNS AUTO: ABNORMAL /HPF

## 2021-02-16 PROCEDURE — 84154 ASSAY OF PSA FREE: CPT | Mod: 90 | Performed by: PHYSICIAN ASSISTANT

## 2021-02-16 PROCEDURE — 81001 URINALYSIS AUTO W/SCOPE: CPT | Performed by: PHYSICIAN ASSISTANT

## 2021-02-16 PROCEDURE — 84153 ASSAY OF PSA TOTAL: CPT | Mod: 90 | Performed by: PHYSICIAN ASSISTANT

## 2021-02-16 PROCEDURE — 80053 COMPREHEN METABOLIC PANEL: CPT | Performed by: PHYSICIAN ASSISTANT

## 2021-02-16 PROCEDURE — 99214 OFFICE O/P EST MOD 30 MIN: CPT | Performed by: PHYSICIAN ASSISTANT

## 2021-02-16 PROCEDURE — 83036 HEMOGLOBIN GLYCOSYLATED A1C: CPT | Performed by: PHYSICIAN ASSISTANT

## 2021-02-16 PROCEDURE — 99000 SPECIMEN HANDLING OFFICE-LAB: CPT | Performed by: PHYSICIAN ASSISTANT

## 2021-02-16 PROCEDURE — 36415 COLL VENOUS BLD VENIPUNCTURE: CPT | Performed by: PHYSICIAN ASSISTANT

## 2021-02-16 NOTE — PATIENT INSTRUCTIONS
Labs today  If all normal, I'll refer to urology  Return to clinic for any new or worsening symptoms or go to ER Urgent care in off hours

## 2021-02-16 NOTE — PROGRESS NOTES
"    Assessment & Plan       ICD-10-CM    1. Frequent urination  R35.0 UA with Microscopic reflex to Culture     PSA total and free     Hemoglobin A1c     Comprehensive metabolic panel (BMP + Alb, Alk Phos, ALT, AST, Total. Bili, TP)   2. PTSD (post-traumatic stress disorder)  F43.10    3. DOMENICO (generalized anxiety disorder)  F41.1    4. Moderate major depression (H)  F32.1        Depression and anxiety managed by psychiatry.      BMI:   Estimated body mass index is 27.47 kg/m  as calculated from the following:    Height as of 8/26/20: 1.753 m (5' 9\").    Weight as of this encounter: 84.4 kg (186 lb).       Patient Instructions   Labs today  If all normal, I'll refer to urology  Return to clinic for any new or worsening symptoms or go to ER Urgent care in off hours        No follow-ups on file.    SUKUMAR [facility] SINCERE Lubin is a 40 year old who presents for the following health issues     HPI       Genitourinary - Male  Onset/Duration: 10 years - patient notices when he eats or drinks something that has a high amount of sugar is when he notices the frequency in urination. Patient also stated feel dehydrated.    Description:   Dysuria (painful urination): no}  Hematuria (blood in urine): no  Frequency: YES  Waking at night to urinate: no  Hesitancy (delay in urine): no  Retention (unable to empty): YES  Decrease in urinary flow: no  Incontinence: no  Progression of Symptoms:  worsening  Accompanying Signs & Symptoms:  Fever: no  Back/Flank pain: no  Urethral discharge: no  Testicle lumps/masses/pain: no  Nausea and/or vomiting: no  Abdominal pain: no  History:   History of frequent UTI s: no  History of kidney stones: no  History of hernias: no  Personal or Family history of Prostate problems: no  Sexually active: no  Precipitating or alleviating factors: None  Therapies tried and outcome: none    Urinates about 15 times a day  Feels really thirsty    Diet: lean " chicken, rice, broccoli, fast food 4-5 times per week, deli sandwich, cereal  Exercise: body resistance every other day    Review of Systems   CONSTITUTIONAL: NEGATIVE for fever, chills, change in weight  ENT/MOUTH: NEGATIVE for ear, mouth and throat problems  RESP: NEGATIVE for significant cough or SOB  CV: NEGATIVE for chest pain, palpitations or peripheral edema  GI: NEGATIVE for nausea, abdominal pain, heartburn, or change in bowel habits  : negative for dysuria, hematuria,, erectile dysfunction      Objective    /70   Pulse 120   Temp 98.7  F (37.1  C) (Temporal)   Wt 84.4 kg (186 lb)   SpO2 96%   BMI 27.47 kg/m    Body mass index is 27.47 kg/m .  Physical Exam   GENERAL: healthy, alert and no distress  EYES: Eyes grossly normal to inspection, PERRL and conjunctivae and sclerae normal  HENT: ear canals and TM's normal, nose and mouth without ulcers or lesions  NECK: no adenopathy, no asymmetry, masses, or scars and thyroid normal to palpation  RESP: lungs clear to auscultation - no rales, rhonchi or wheezes  CV: regular rate and rhythm, normal S1 S2, no S3 or S4, no murmur, click or rub, no peripheral edema and peripheral pulses strong  ABDOMEN: soft, nontender, no hepatosplenomegaly, no masses and bowel sounds normal  MS: no gross musculoskeletal defects noted, no edema  SKIN: no suspicious lesions or rashes  NEURO: Normal strength and tone, mentation intact and speech normal  PSYCH: mentation appears normal, affect flat                 No deformity or limitation of movement

## 2021-02-17 LAB
ALBUMIN SERPL-MCNC: 4 G/DL (ref 3.4–5)
ALP SERPL-CCNC: 112 U/L (ref 40–150)
ALT SERPL W P-5'-P-CCNC: 50 U/L (ref 0–70)
ANION GAP SERPL CALCULATED.3IONS-SCNC: 4 MMOL/L (ref 3–14)
AST SERPL W P-5'-P-CCNC: 19 U/L (ref 0–45)
BILIRUB SERPL-MCNC: 0.5 MG/DL (ref 0.2–1.3)
BUN SERPL-MCNC: 12 MG/DL (ref 7–30)
CALCIUM SERPL-MCNC: 9.4 MG/DL (ref 8.5–10.1)
CHLORIDE SERPL-SCNC: 106 MMOL/L (ref 94–109)
CO2 SERPL-SCNC: 30 MMOL/L (ref 20–32)
CREAT SERPL-MCNC: 1.2 MG/DL (ref 0.66–1.25)
GFR SERPL CREATININE-BSD FRML MDRD: 75 ML/MIN/{1.73_M2}
GLUCOSE SERPL-MCNC: 84 MG/DL (ref 70–99)
POTASSIUM SERPL-SCNC: 4.4 MMOL/L (ref 3.4–5.3)
PROT SERPL-MCNC: 7.8 G/DL (ref 6.8–8.8)
SODIUM SERPL-SCNC: 140 MMOL/L (ref 133–144)

## 2021-02-20 LAB
PSA FREE MFR SERPL: 10 %
PSA FREE SERPL-MCNC: 0.2 NG/ML
PSA SERPL-MCNC: 2 NG/ML (ref 0–4)

## 2021-02-26 NOTE — TELEPHONE ENCOUNTER
MEDICAL RECORDS REQUEST   [facility]  Urology Clinic  [address] [place] [address]  PHONE: [phone]  Fax: [phone]        FUTURE VISIT INFORMATION                                                   Tristian Magaña, : 1980 scheduled for future visit at [facility]    APPOINTMENT INFORMATION:    Date: 3/9/2021    Provider:  Anna Marie SIMMS    Reason for Visit/Diagnosis: Urinary retention     REFERRAL INFORMATION:    Referring provider:  N/A    Specialty: N/A    Referring providers clinic:      Clinic contact number:  N/A    RECORDS REQUESTED FOR VISIT                                                     NOTES  STATUS/DETAILS   OFFICE NOTE from referring provider  yes   OFFICE NOTE from other specialist  no   DISCHARGE SUMMARY from hospital  no   DISCHARGE REPORT from the ER  no   OPERATIVE REPORT  no   MEDICATION LIST  no   LABS     URINALYSIS (UA)  yes     PRE-VISIT CHECKLIST      Record collection complete Yes   Appointment appropriately scheduled           (right time/right provider) Yes   MyChart activation Yes   Questionnaire complete If no, please explain: In process      Completed by: Aracely Castro

## 2021-03-02 ENCOUNTER — PRE VISIT (OUTPATIENT)
Dept: UROLOGY | Facility: CLINIC | Age: 41
End: 2021-03-02

## 2021-03-02 NOTE — TELEPHONE ENCOUNTER
Reason for visit: Urinary urgency     Relevant information: referred by Kayla Hoffmann PA-C    Records/imaging/labs/orders: all records available, AUA assigned    Pt called: no need for a call

## 2021-03-07 ENCOUNTER — HEALTH MAINTENANCE LETTER (OUTPATIENT)
Age: 41
End: 2021-03-07

## 2021-03-09 ENCOUNTER — PRE VISIT (OUTPATIENT)
Dept: UROLOGY | Facility: CLINIC | Age: 41
End: 2021-03-09

## 2021-03-09 ENCOUNTER — VIRTUAL VISIT (OUTPATIENT)
Dept: UROLOGY | Facility: CLINIC | Age: 41
End: 2021-03-09
Attending: PHYSICIAN ASSISTANT
Payer: OTHER GOVERNMENT

## 2021-03-09 DIAGNOSIS — R35.0 URINARY FREQUENCY: Primary | ICD-10-CM

## 2021-03-09 PROCEDURE — 99203 OFFICE O/P NEW LOW 30 MIN: CPT | Mod: 95 | Performed by: NURSE PRACTITIONER

## 2021-03-09 ASSESSMENT — PAIN SCALES - GENERAL: PAINLEVEL: NO PAIN (0)

## 2021-03-09 NOTE — PROGRESS NOTES
Tristian is a 41 year old who is being evaluated via a billable video visit.      How would you like to obtain your AVS? MyChart  If the video visit is dropped, the invitation should be resent by: Text to cell phone: [phone]  Will anyone else be joining your video visit? No    Video Start Time: 2:45 PM        Video-Visit Details    Type of service:  Video Visit    Video End Time: 2:57 PM    Originating Location (pt. Location): Home    Distant Location (provider location):  [facility] [place]     Platform used for Video Visit: [employer]         Tristian Magaña complains of   Chief Complaint   Patient presents with     Consult     urinary urgency        Assessment/Plan:  41 year old male with morning urinary frequency for the past few years, which seems to correlate with caffeine intake. Also quite sedentary. In the absence of other signs or symptoms, and with negative testing thus far, I suspect his caffeine intake is the primary culprit for his symptoms, but there may also be a component of pelvic floor dysfunction present due to his high stress levels and sedentary lifestyle.   -Recommend working on reducing caffeine intake, and also increasing activity.  -Will monitor for now, but would be happy to see him as needed for an in-person visit if symptoms were to remain or worsen.     Julianna Thrasher, CNP  Department of Urology      Subjective:   Very pleasant 41 year old male with a history of anxiety, depression, and PTSD who presents for evaluation of urinary frequency. Was seen by FP a few weeks ago for this complaint, and UA, PSA, HgbA1c, and metabolic panel all largely unremarkable. This issue has been present for a few years. Frequency only an issue in the morning. No nocturia. Denies any associated symptoms, including dysuria, pyuria, hesitancy, intermittency, feelings of incomplete emptying, or any recent history of urinary tract infections or stones.    He used to drink alcohol  heavily, but is now sober. Acknowledges that he is addicted to caffeine and therefore consumes 3-4 cans of Coke per day. Urination seems more frequent in correlation with this.     Activity-wise, he is fairly sedentary. Is not working currently. Often sits on the couch for long hours. Feels he is under a constant amount of stress.       Objective:     Exam:   Patient is a 41 year old male   General Appearance: Well groomed, hygenic  Respiratory: No cough, no respiratory distress or labored breathing  Musculoskeletal: Grossly normal with no gross deficits  Skin: No discoloration or apparent rashes  Neurologic: No tremors  Psychiatric: Alert and oriented  Further examination is deferred due to the nature of our visit.

## 2021-03-09 NOTE — NURSING NOTE
Chief Complaint   Patient presents with     Consult     urinary urgency        There were no vitals taken for this visit. There is no height or weight on file to calculate BMI.    Patient Active Problem List   Diagnosis     PTSD (post-traumatic stress disorder)     DOMENICO (generalized anxiety disorder)     Moderate major depression (H)       Allergies   Allergen Reactions     Penicillins        Current Outpatient Medications   Medication Sig Dispense Refill     buPROPion HCl (WELLBUTRIN XL PO) 300 mg daily       fish oil-omega-3 fatty acids 1000 MG capsule Take 2 g by mouth daily       ibuprofen (ADVIL/MOTRIN) 600 MG tablet Take 600 mg by mouth every 6 hours as needed for moderate pain       Multiple Vitamins-Minerals (MULTIVITAMIN ADULT PO) Take by mouth daily       Vilazodone HCl (VIIBRYD PO) Daily         Social History     Tobacco Use     Smoking status: Never Smoker     Smokeless tobacco: Never Used   Substance Use Topics     Alcohol use: Yes     Alcohol/week: 6.0 standard drinks     Types: 6 Cans of beer per week     Comment: seldom     Drug use: No       Maureen Lopez CMA  3/9/2021  1:50 PM

## 2021-03-09 NOTE — PATIENT INSTRUCTIONS
UROLOGY CLINIC VISIT PATIENT INSTRUCTIONS    -Work on increasing activity at home and reducing caffeine intake, as tolerated.  -Follow up with me as needed moving forward for an in-person clinic visit if symptoms continue.     If you have any issues, questions or concerns in the meantime, do not hesitate to contact us at [phone] or via [employer].     It was a pleasure meeting with you today.  Thank you for allowing me and my team the privilege of caring for you today.  YOU are the reason we are here, and I truly hope we provided you with the excellent service you deserve.  Please let us know if there is anything else we can do for you so that we can be sure you are leaving completely satisfied with your care experience.    Julianna Thrasher, CNP

## 2021-03-09 NOTE — LETTER
3/9/2021       RE: Tristian Magaña  [address]  [place] [address]     Dear Colleague,    Thank you for referring your patient, Tristian Magaña, to the [facility] [place] at [facility]. Please see a copy of my visit note below.    Tristian is a 41 year old who is being evaluated via a billable video visit.      How would you like to obtain your AVS? MyChart  If the video visit is dropped, the invitation should be resent by: Text to cell phone: [phone]  Will anyone else be joining your video visit? No    Video Start Time: 2:45 PM        Video-Visit Details    Type of service:  Video Visit    Video End Time: 2:57 PM    Originating Location (pt. Location): Home    Distant Location (provider location):  [facility] [place]     Platform used for Video Visit: [employer]         Tristian Magaña complains of   Chief Complaint   Patient presents with     Consult     urinary urgency        Assessment/Plan:  41 year old male with morning urinary frequency for the past few years, which seems to correlate with caffeine intake. Also quite sedentary. In the absence of other signs or symptoms, and with negative testing thus far, I suspect his caffeine intake is the primary culprit for his symptoms, but there may also be a component of pelvic floor dysfunction present due to his high stress levels and sedentary lifestyle.   -Recommend working on reducing caffeine intake, and also increasing activity.  -Will monitor for now, but would be happy to see him as needed for an in-person visit if symptoms were to remain or worsen.     Julianna Thrasher, CNP  Department of Urology      Subjective:   Very pleasant 41 year old male with a history of anxiety, depression, and PTSD who presents for evaluation of urinary frequency. Was seen by FP a few weeks ago for this complaint, and UA, PSA, HgbA1c, and metabolic panel all largely unremarkable.  This issue has been present for a few years. Frequency only an issue in the morning. No nocturia. Denies any associated symptoms, including dysuria, pyuria, hesitancy, intermittency, feelings of incomplete emptying, or any recent history of urinary tract infections or stones.    He used to drink alcohol heavily, but is now sober. Acknowledges that he is addicted to caffeine and therefore consumes 3-4 cans of Coke per day. Urination seems more frequent in correlation with this.     Activity-wise, he is fairly sedentary. Is not working currently. Often sits on the couch for long hours. Feels he is under a constant amount of stress.       Objective:     Exam:   Patient is a 41 year old male   General Appearance: Well groomed, hygenic  Respiratory: No cough, no respiratory distress or labored breathing  Musculoskeletal: Grossly normal with no gross deficits  Skin: No discoloration or apparent rashes  Neurologic: No tremors  Psychiatric: Alert and oriented  Further examination is deferred due to the nature of our visit.

## 2021-10-10 ENCOUNTER — HEALTH MAINTENANCE LETTER (OUTPATIENT)
Age: 41
End: 2021-10-10

## 2021-10-19 PROBLEM — F32.9 MAJOR DEPRESSION: Status: ACTIVE | Noted: 2021-02-16

## 2021-11-11 ENCOUNTER — OFFICE VISIT (OUTPATIENT)
Dept: FAMILY MEDICINE | Facility: CLINIC | Age: 41
End: 2021-11-11
Payer: OTHER GOVERNMENT

## 2021-11-11 VITALS
OXYGEN SATURATION: 96 % | WEIGHT: 188.2 LBS | BODY MASS INDEX: 28.52 KG/M2 | SYSTOLIC BLOOD PRESSURE: 145 MMHG | DIASTOLIC BLOOD PRESSURE: 86 MMHG | HEIGHT: 68 IN | TEMPERATURE: 98.5 F | HEART RATE: 108 BPM

## 2021-11-11 DIAGNOSIS — H61.22 IMPACTED CERUMEN OF LEFT EAR: Primary | ICD-10-CM

## 2021-11-11 ASSESSMENT — MIFFLIN-ST. JEOR: SCORE: 1737.93

## 2021-11-11 NOTE — NURSING NOTE
"ROOM:1    Preferred Name: Tristian     41 year old  Chief Complaint   Patient presents with     Ear Problem     Left ear plugged       Blood pressure (!) 145/86, pulse 108, temperature 98.5  F (36.9  C), temperature source Oral, height 1.735 m (5' 8.3\"), weight 85.4 kg (188 lb 3.2 oz), SpO2 96 %. Body mass index is 28.36 kg/m .      Patient Active Problem List   Diagnosis     PTSD (post-traumatic stress disorder)     DOMENICO (generalized anxiety disorder)     Moderate major depression (H)       Wt Readings from Last 2 Encounters:   11/11/21 85.4 kg (188 lb 3.2 oz)   02/16/21 84.4 kg (186 lb)     BP Readings from Last 3 Encounters:   11/11/21 (!) 145/86   02/16/21 120/70   08/26/20 122/82       Allergies   Allergen Reactions     Penicillins        Current Outpatient Medications   Medication     buPROPion HCl (WELLBUTRIN XL PO)     fish oil-omega-3 fatty acids 1000 MG capsule     Multiple Vitamins-Minerals (MULTIVITAMIN ADULT PO)     Vilazodone HCl (VIIBRYD PO)     ibuprofen (ADVIL/MOTRIN) 600 MG tablet     No current facility-administered medications for this visit.       Social History     Tobacco Use     Smoking status: Never Smoker     Smokeless tobacco: Never Used   Substance Use Topics     Alcohol use: Yes     Alcohol/week: 6.0 standard drinks     Types: 6 Cans of beer per week     Comment: seldom     Drug use: No       Honoring Choices - Health Care Directive Guide offered to patient at time of visit.    Health Maintenance Due   Topic Date Due     PREVENTIVE CARE VISIT  Never done     ADVANCE CARE PLANNING  Never done     DEPRESSION ACTION PLAN  Never done     HIV SCREENING  Never done     HEPATITIS C SCREENING  Never done     LIPID  Never done     PHQ-9  02/26/2021     INFLUENZA VACCINE (1) 09/01/2021     COVID-19 Vaccine (3 - Booster for Moderna series) 10/18/2021         There is no immunization history on file for this patient.    No results found for: PAP      Recent Labs   Lab Test 02/16/21  1523   A1C 5.6   ALT " 50   CR 1.20   GFRESTIMATED 75   GFRESTBLACK 87   ALBUMIN 4.0   POTASSIUM 4.4       PHQ-2 ( 1999 Pfizer) 11/11/2021 2/16/2021   Q1: Little interest or pleasure in doing things 0 0   Q2: Feeling down, depressed or hopeless 1 3   PHQ-2 Score 1 3       PHQ-9 SCORE 6/13/2018 8/26/2020   PHQ-9 Total Score 0 0       DOMENICO-7 SCORE 6/13/2018 8/26/2020   Total Score 0 0       No flowsheet data found.      Jesenia Sotelo, [facility]  November 11, 2021 12:34 PM

## 2021-11-11 NOTE — PROGRESS NOTES
"Chief Complaint   Patient presents with     Ear Problem     Left ear plugged       HPI: Tristian is a 41 year old male here today with complaints of left ear being plugged. He also reports sore throat, cough, and some shortness of breath with exertion for the past 4-5 days. Denies fever or chest pain. 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 Vaccinated.   Tristian stated his left ear being plugged started approximately 1 week ago. It is intermittent, reporting it to be plugged approximately 60% of the time. When the ear becomes plugged he reports it last about a day or so and makes it more difficult to hear out of. Blowing his nose provides some relief at times. In 2020 he had ringing/pressure in his ears and he stated that an ear flushing helped.    Patient also noted his blood pressure being elevated today. Inquiring if could be associated with his medications, Wellbutrin and Vibryd.     ROS:Review of Systems - negative except for what's listed in the HPI    SH: The Patient's  reports that he has never smoked. He has never used smokeless tobacco. He reports current alcohol use of about 6.0 standard drinks of alcohol per week. He reports that he does not use drugs.      FH: The Patient's family history includes Dementia in his father; Diabetes in his father and maternal grandfather; Heart Disease in his maternal grandmother; No Known Problems in his brother, mother, and sister; Parkinsonism in his father; Seizure Disorder in his father.     Meds:    Current Outpatient Medications   Medication     buPROPion HCl (WELLBUTRIN XL PO)     fish oil-omega-3 fatty acids 1000 MG capsule     Multiple Vitamins-Minerals (MULTIVITAMIN ADULT PO)     Vilazodone HCl (VIIBRYD PO)     ibuprofen (ADVIL/MOTRIN) 600 MG tablet     No current facility-administered medications for this visit.     O:    BP (!) 145/86   Pulse 108   Temp 98.5  F (36.9  C) (Oral)   Ht 1.735 m (5' 8.3\")   Wt 85.4 kg " (188 lb 3.2 oz)   SpO2 96%   BMI 28.36 kg/m      Physical Examination:     General appearance - alert, well appearing, and in no distress    Mental status - alert, oriented to person, place, and time    Ears - external ears symmetrical. Inner left ear difficult to visualize, impacted with wax.     A/P:     (H61.22) Impacted cerumen of left ear  (primary encounter diagnosis)  Plan: Flushed out left ear today.     Recommended cerumenex or debrox weekly to avoid impacted cerumen.    Advised patient to get tested for COVID-19 with current symptoms.     Shakira Benjamin, [employer] student    I, Shana Monroy, DNP, APRN, FNP, ANP, reviewed and verified the nurse practitioner (NP)  student's documentation of the patient's history.  I performed the exam and medical decision making activities with the NP student, who was present for learning purposes.

## 2022-03-26 ENCOUNTER — HEALTH MAINTENANCE LETTER (OUTPATIENT)
Age: 42
End: 2022-03-26

## 2022-09-18 ENCOUNTER — HEALTH MAINTENANCE LETTER (OUTPATIENT)
Age: 42
End: 2022-09-18

## 2022-11-29 ENCOUNTER — OFFICE VISIT (OUTPATIENT)
Dept: FAMILY MEDICINE | Facility: CLINIC | Age: 42
End: 2022-11-29
Payer: OTHER GOVERNMENT

## 2022-11-29 VITALS
HEIGHT: 69 IN | BODY MASS INDEX: 27.98 KG/M2 | DIASTOLIC BLOOD PRESSURE: 83 MMHG | WEIGHT: 188.9 LBS | HEART RATE: 76 BPM | SYSTOLIC BLOOD PRESSURE: 158 MMHG | TEMPERATURE: 97.3 F

## 2022-11-29 DIAGNOSIS — I10 BENIGN ESSENTIAL HYPERTENSION: ICD-10-CM

## 2022-11-29 DIAGNOSIS — H61.23 BILATERAL IMPACTED CERUMEN: Primary | ICD-10-CM

## 2022-11-29 DIAGNOSIS — Z83.3 FAMILY HISTORY OF DIABETES MELLITUS: ICD-10-CM

## 2022-11-29 LAB
ALBUMIN SERPL BCG-MCNC: 4.7 G/DL (ref 3.5–5.2)
ALP SERPL-CCNC: 102 U/L (ref 40–129)
ALT SERPL W P-5'-P-CCNC: 36 U/L (ref 10–50)
ANION GAP SERPL CALCULATED.3IONS-SCNC: 14 MMOL/L (ref 7–15)
AST SERPL W P-5'-P-CCNC: 31 U/L (ref 10–50)
BILIRUB SERPL-MCNC: 0.4 MG/DL
BUN SERPL-MCNC: 13.7 MG/DL (ref 6–20)
CALCIUM SERPL-MCNC: 9.5 MG/DL (ref 8.6–10)
CHLORIDE SERPL-SCNC: 102 MMOL/L (ref 98–107)
CHOLEST SERPL-MCNC: 200 MG/DL
CREAT SERPL-MCNC: 1 MG/DL (ref 0.67–1.17)
DEPRECATED HCO3 PLAS-SCNC: 22 MMOL/L (ref 22–29)
ERYTHROCYTE [DISTWIDTH] IN BLOOD BY AUTOMATED COUNT: 13.3 % (ref 10–15)
GFR SERPL CREATININE-BSD FRML MDRD: >90 ML/MIN/1.73M2
GLUCOSE SERPL-MCNC: 88 MG/DL (ref 70–99)
HBA1C MFR BLD: 5.9 %
HCT VFR BLD AUTO: 48.2 % (ref 40–53)
HDLC SERPL-MCNC: 42 MG/DL
HGB BLD-MCNC: 15.6 G/DL (ref 13.3–17.7)
LDLC SERPL CALC-MCNC: 126 MG/DL
MCH RBC QN AUTO: 27 PG (ref 26.5–33)
MCHC RBC AUTO-ENTMCNC: 32.4 G/DL (ref 31.5–36.5)
MCV RBC AUTO: 83 FL (ref 78–100)
NONHDLC SERPL-MCNC: 158 MG/DL
PLATELET # BLD AUTO: 330 10E3/UL (ref 150–450)
POTASSIUM SERPL-SCNC: 4.4 MMOL/L (ref 3.4–5.3)
PROT SERPL-MCNC: 7.3 G/DL (ref 6.4–8.3)
RBC # BLD AUTO: 5.78 10E6/UL (ref 4.4–5.9)
SODIUM SERPL-SCNC: 138 MMOL/L (ref 136–145)
TRIGL SERPL-MCNC: 159 MG/DL
WBC # BLD AUTO: 9.1 10E3/UL (ref 4–11)

## 2022-11-29 PROCEDURE — 85027 COMPLETE CBC AUTOMATED: CPT | Performed by: NURSE PRACTITIONER

## 2022-11-29 PROCEDURE — 82043 UR ALBUMIN QUANTITATIVE: CPT | Performed by: NURSE PRACTITIONER

## 2022-11-29 PROCEDURE — 80061 LIPID PANEL: CPT | Performed by: NURSE PRACTITIONER

## 2022-11-29 PROCEDURE — 82040 ASSAY OF SERUM ALBUMIN: CPT | Performed by: NURSE PRACTITIONER

## 2022-11-29 PROCEDURE — 84443 ASSAY THYROID STIM HORMONE: CPT | Performed by: NURSE PRACTITIONER

## 2022-11-29 PROCEDURE — 83036 HEMOGLOBIN GLYCOSYLATED A1C: CPT | Performed by: NURSE PRACTITIONER

## 2022-11-29 PROCEDURE — 80053 COMPREHEN METABOLIC PANEL: CPT | Performed by: NURSE PRACTITIONER

## 2022-11-29 RX ORDER — DEXTROAMPHETAMINE SACCHARATE, AMPHETAMINE ASPARTATE MONOHYDRATE, DEXTROAMPHETAMINE SULFATE AND AMPHETAMINE SULFATE 2.5; 2.5; 2.5; 2.5 MG/1; MG/1; MG/1; MG/1
10 CAPSULE, EXTENDED RELEASE ORAL DAILY
COMMUNITY
Start: 2022-11-10

## 2022-11-29 RX ORDER — DEXTROAMPHETAMINE SACCHARATE, AMPHETAMINE ASPARTATE, DEXTROAMPHETAMINE SULFATE AND AMPHETAMINE SULFATE 1.25; 1.25; 1.25; 1.25 MG/1; MG/1; MG/1; MG/1
5 TABLET ORAL DAILY
COMMUNITY
Start: 2022-11-11

## 2022-11-29 ASSESSMENT — PATIENT HEALTH QUESTIONNAIRE - PHQ9: SUM OF ALL RESPONSES TO PHQ QUESTIONS 1-9: 7

## 2022-11-29 NOTE — PATIENT INSTRUCTIONS
Tinnitus and hearing loss   Both ears impacted: irrigate today with minium out   Use over the counter Carbamide (Debrox) 5 drops into ear daily for 3-4 days in the month to loosen wax. Avoid Qtips as this pushes wax into ears.   Follow up if ringing or hearing loss doesn't improve after irrigating and home treatment     High Blood Pressure (goal 120/80)  Lifestyle modifications; low sodium diet (DASH diet). Increase exercise 3-5 times per week.   Try to check blood pressure at home or at local pharmacy. Ordered BP monitor for home. Take at different times of day and record readings.   Signs/symptoms to monitor for include: headaches, blurry vision, swelling in legs   Schedule yearly eye exam     Labs today: CMP, CBC, lipids, A1C. Will follow up with results     Return to clinic in one month for physical with blood pressure recheck

## 2022-11-29 NOTE — NURSING NOTE
"ROOM:[address] [place], [place] [address]FIN[place]    Preferred Name: Tristian MCPHERSON AGREED:              [place] DECLINED:              42 year old  Chief Complaint   Patient presents with     Ear Problem     Right ear, hearing affected and high pitched noise worse when lying down, last couple weeks       Blood pressure (!) 152/103, pulse 102, temperature 97.3  F (36.3  C), temperature source Oral, height 1.758 m (5' 9.2\"), weight 85.7 kg (188 lb 14.4 oz). Body mass index is 27.73 kg/m .  BP completed using cuff size:        Patient Active Problem List   Diagnosis     PTSD (post-traumatic stress disorder)     DOMENICO (generalized anxiety disorder)     Moderate major depression (H)       Wt Readings from Last 2 Encounters:   11/29/22 85.7 kg (188 lb 14.4 oz)   11/11/21 85.4 kg (188 lb 3.2 oz)     BP Readings from Last 3 Encounters:   11/29/22 (!) 152/103   11/11/21 (!) 145/86   02/16/21 120/70       Allergies   Allergen Reactions     Penicillins        Current Outpatient Medications   Medication     amphetamine-dextroamphetamine (ADDERALL XR) 10 MG 24 hr capsule     amphetamine-dextroamphetamine (ADDERALL) 5 MG tablet     fish oil-omega-3 fatty acids 1000 MG capsule     Multiple Vitamins-Minerals (MULTIVITAMIN ADULT PO)     buPROPion HCl (WELLBUTRIN XL PO)     Vilazodone HCl (VIIBRYD PO)     No current facility-administered medications for this visit.       Social History     Tobacco Use     Smoking status: Never     Smokeless tobacco: Never   Substance Use Topics     Alcohol use: Yes     Alcohol/week: 6.0 standard drinks     Types: 6 Cans of beer per week     Comment: seldom     Drug use: No       Honoring Choices - Health Care Directive Guide offered to patient at time of visit.    Health Maintenance Due   Topic Date Due     YEARLY PREVENTIVE VISIT  Never done     ADVANCE CARE PLANNING  Never done     DEPRESSION ACTION PLAN  Never done     HIV SCREENING  Never done     HEPATITIS C SCREENING  Never done     LIPID  Never done     DTAP/TDAP/TD " IMMUNIZATION (2 - Td or Tdap) 01/13/2022     INFLUENZA VACCINE (1) 09/01/2022       Immunization History   Administered Date(s) Administered     COVID-19 Vaccine 18+ (Moderna) 03/21/2021, 04/18/2021, 01/13/2022     COVID-19 Vaccine Bivalent Booster 18+ (Moderna) 11/18/2022       No results found for: PAP    Recent Labs   Lab Test 02/16/21  1523   A1C 5.6   ALT 50   CR 1.20   GFRESTIMATED 75   GFRESTBLACK 87   ALBUMIN 4.0   POTASSIUM 4.4       PHQ-2 ( 1999 Pfizer) 11/29/2022 11/11/2021   Q1: Little interest or pleasure in doing things 0 0   Q2: Feeling down, depressed or hopeless 2 1   PHQ-2 Score 2 1   PHQ-2 Total Score (12-17 Years)- Positive if 3 or more points; Administer PHQ-A if positive - -       PHQ-9 SCORE 6/13/2018 8/26/2020 11/29/2022   PHQ-9 Total Score 0 0 7       DOMENICO-7 SCORE 6/13/2018 8/26/2020   Total Score 0 0       No flowsheet data found.    Sherry Angel    November 29, 2022 10:29 AM

## 2022-11-29 NOTE — PROGRESS NOTES
"ACUTE VISIT    Subjective  Tristian, 42 year old male presents today with a 2-3 week history of high pitched ringing in right ear. Ringing is constant but most noticeable at night. Not associated with pain, pounding, or throbbing. No associated symptoms of dizziness or headache. No known trauma to ear- does use Q-tips and history of impacted cerumen. No drainage noticed. Does feel there is a decrease in hearing in right ear. Denies fever, chills, malaise, nasal or sinus congestion. PMH + for PTSD, depression, anxiety. No longer on medication or seeing counseling but reports doing well. No surgical history. H significant for heart disease but unsure if HTN vs CAD.    Also presents with increased BP today, reports being told its been high in past. Never treated. Last CMP from 02/16/2021 shows creatinine 1.20.     ROS  Gen: denies fever, chills, malaise   HEENT: see HPI. Last eye exam 1 year ago. Denies vision changes. Negative sore throat.   Resp: denies shortness of breath   CV: denies chest pain, palpitations, irregular heart beat. Reports being told BP is high but thought last check was lower, uncertain of numbers. Wonders if high BP is from Adderall, takes 15mg once daily.   Neuro: denies numbness or tingling in extremities  Endo: negative for weight changes. +H DM (father)    Objective    Vitals   Initial BP: 152/103, pulse 102  BP (!) 158/83   Pulse 76   Temp 97.3  F (36.3  C) (Oral)   Ht 1.758 m (5' 9.2\")   Wt 85.7 kg (188 lb 14.4 oz)   BMI 27.73 kg/m         Physical Exam     Gen: alert, in no acute distress   HEENT: head normocephalic. Eyes symmetrical, EOM intact. Optic disks creamy, crisp. Bilateral ears impacted with cerumen. TM unable to visualized bilaterally. Irrigated by MA with minimal output. Follow up exam shows cerumen remains impacted. Utilized curettage bilaterally but unable to remove. TM remain unable to visualize post interventions. Nares patents, mucosa pink, intact, non edematous. OP " mucosa pink, intact. No tonsillar edema or patchy infiltrates.   Neck: thyroid displaced with swallow. Right side more prominent than left. No mass or nodules palpated. No lymphadenopathy.   Resp:unlabored respiratory effort. Lung sounds clear bilateral. No adventitious lung sounds.  CV: S1, S2. No S3, S4. No murmur, click, gallop. No peripheral edema.       Assessment/Plan    1. (H61.23) Bilateral impacted cerumen  (primary encounter diagnosis)  Comment: bilateral ears irrigated and utilized curettage bilaterally but unable to remove and TM remain unable to visualize post interventions. Home debrox ordered to soften. Can return for irrigation after Debrox if tinnitus persist. Avoid Q-tips.   Plan: REMOVE IMPACTED CERUMEN, carbamide peroxide         (DEBROX) 6.5 % otic solution        2. (I10) Benign essential hypertension  Comment: BP elevated today, slight improvement on recheck. Patient endorses dietary changes with increase in salt and decreased exercise. Last CMP from February 2021 shows high-normal creatinine (1.20). Provided lifestyle modifications and education; DASH diet, reduce caffeine. Will trial for 1 month and recheck BP. Home BP cuff ordered. Labs to assess renal function in setting of high-normal creatinine 1 year ago and to guide possible medication management. TSH in setting of prominent thyroid on right side. Consider ACE-I therapy, could consider SANDER. Goal /80  Plan: Comprehensive metabolic panel, CBC with         platelets, Lipid panel reflex to direct LDL         Fasting, Albumin Random Urine Quantitative with        Creat Ratio, Home Blood Pressure Monitor Order         for DME - ONLY FOR DME, TSH with free T4 reflex         3. (Z83.3) Family history of diabetes mellitus  Comment: in setting of high blood pressure, see problem 2 and positive family history, check A1c today.   Plan: Hemoglobin A1c      Follow up: please schedule a physical exam in 1 month; follow up with blood pressure at  that time     Clarita Lindsey), RN, BAN  Doctor of Nursing Practice Student  Class of 2024   [employer] [facility]  [employer]   I was present with the NPP student who participated in the service and in the documentation of the services provided. I have verified the history and personally performed the physical exam and medical decision making, as documented by the student and edited by me    TOM Hackett CNP  11/29/22  12:20 PM

## 2022-11-29 NOTE — NURSING NOTE
bilateral ear lavage done.  Small amount of cerumen irrigated using luke warm water and hydrogen peroxide without incident. Patient tolerated procedure well.     Sherry RANGEL CMA 11:35 AM 11/29/2022

## 2022-11-30 LAB
CREAT UR-MCNC: 181 MG/DL
MICROALBUMIN UR-MCNC: <12 MG/L
MICROALBUMIN/CREAT UR: NORMAL MG/G{CREAT}
TSH SERPL DL<=0.005 MIU/L-ACNC: 1.01 UIU/ML (ref 0.3–4.2)

## 2023-01-03 ENCOUNTER — OFFICE VISIT (OUTPATIENT)
Dept: FAMILY MEDICINE | Facility: CLINIC | Age: 43
End: 2023-01-03
Payer: OTHER GOVERNMENT

## 2023-01-03 VITALS
SYSTOLIC BLOOD PRESSURE: 131 MMHG | BODY MASS INDEX: 27.46 KG/M2 | RESPIRATION RATE: 16 BRPM | DIASTOLIC BLOOD PRESSURE: 90 MMHG | WEIGHT: 187 LBS | TEMPERATURE: 97.4 F | HEART RATE: 76 BPM | OXYGEN SATURATION: 98 %

## 2023-01-03 DIAGNOSIS — R73.03 PRE-DIABETES: ICD-10-CM

## 2023-01-03 DIAGNOSIS — I10 BENIGN ESSENTIAL HYPERTENSION: Primary | ICD-10-CM

## 2023-01-03 DIAGNOSIS — E78.5 HYPERLIPIDEMIA LDL GOAL <100: ICD-10-CM

## 2023-01-03 DIAGNOSIS — H61.23 BILATERAL IMPACTED CERUMEN: ICD-10-CM

## 2023-01-03 RX ORDER — LISINOPRIL 5 MG/1
5 TABLET ORAL DAILY
Qty: 90 TABLET | Refills: 1 | Status: SHIPPED | OUTPATIENT
Start: 2023-01-03 | End: 2023-01-18 | Stop reason: SINTOL

## 2023-01-03 NOTE — PATIENT INSTRUCTIONS
Hypertension and elevated cholesterol  Goal -130/80  ASCVD risk 2.2%  Cholesterol elevated: Work on diet: low fat, low salt. Reduce carbohydrates: fast foods, snack foods, sweets and sweetened beverages  Begin Lisinopril 5mg daily  Recheck 1-3 months  Obtain annual eye exam    Pre-DM  Weight loss or maintenance  Diet as above  Plan recheck 6-12 months    Bilateral impacted cerumen  Ear irrigation in clinic  Limit debrox to 3-4 days out of the month  If tinnitus not resolving, will have you see audiology;/ENT

## 2023-01-03 NOTE — PROGRESS NOTES
HPI       Tristian Magaña is a 42 year old  who presents for   Chief Complaint   Patient presents with     Hypertension     Follow up      Ear Problem   42 year-old male with history elevated BP and FMH HTN and hyperlipidemia returns for BP follow up. Renal work up completed at last encounter 11/22. Creatinine, microalbumin and eGFR WNL.  TSH WNL  BP at home running 130s-140s/80s-90. Denies headache, chest pain, irregular heart beat, vision changes, peripheral edeman.   A1c was 5.9 also and has FMH DM. BMI is 27.46    Ringing in both ears, history impacted cerumen. Has used Debrox but unable to dislodge cerumen. Still having ringing in ears, R>L    Problem, Medication and Allergy Lists were reviewed and updated if needed..    Patient is an established patient of this clinic..         Review of Systems:   Review of Systems  GEN: negative for weight changes  HEENT: as per HPI  CV: As per HPI  RESP: Negative SOB         Physical Exam:     Vitals:    01/03/23 1006 01/03/23 1036   BP: (!) 142/86 (!) 131/90   BP Location: Left arm Left arm   Patient Position: Sitting Sitting   Cuff Size: Adult Regular Adult Regular   Pulse: 76    Resp: 16    Temp: 97.4  F (36.3  C)    TempSrc: Oral    SpO2: 98%    Weight: 84.8 kg (187 lb)      Body mass index is 27.46 kg/m .  Vital signs normal except BP elevated     Physical Exam  GEN: alert male in NAD; quiet  HEENT: both TMs impacted cerumen. Canals intact. Irrigated  CV: HRRR, S1, S2 no MRG. Negative Peripheral edema  RESP: lungs CTA    Results:   No testing ordered today    Assessment and Plan        1. Benign essential hypertension  REviewed all lab results. Calculated ASCVD risk 2.2%. Discussed recommendation for medical management of HTN. Will begin with low dose lisinopril. Contact us if develops cough. Reviewed healthy diet, lifestyle. Goal -130/80. Recheck BP 1-3 months.  To monitor at home.   - lisinopril (ZESTRIL) 5 MG tablet; Take 1 tablet (5 mg) by mouth daily   Dispense: 90 tablet; Refill: 1    2. Bilateral impacted cerumen  Ear irrigation. If persistent impaction, will refer to Audiology/ENT  - REMOVE IMPACTED CERUMEN    3. Hyperlipidemia LDL goal <100  Reviewed healthy diet, lifestyle approaches, Goals.     4. Pre-diabetes  Lifestyle as above. Plan recheck 6-12 months.        There are no discontinued medications.    Options for treatment and follow-up care were reviewed with the patient. Tristian Magaña  engaged in the decision making process and verbalized understanding of the options discussed and agreed with the final plan.    TOM Hackett CNP

## 2023-01-03 NOTE — NURSING NOTE
ROOM:  [facility]    Preferred Name: Tristian MCPHERSON AGREED:    [place] DECLINED:    Flu, has already gotten    42 year old  Chief Complaint   Patient presents with     Hypertension     Follow up      Ear Problem       Blood pressure (!) 142/86, pulse 76, temperature 97.4  F (36.3  C), temperature source Oral, resp. rate 16, weight 84.8 kg (187 lb), SpO2 98 %. Body mass index is 27.46 kg/m .  BP completed using cuff size:        Patient Active Problem List   Diagnosis     PTSD (post-traumatic stress disorder)     DOMENICO (generalized anxiety disorder)     Moderate major depression (H)     Bilateral impacted cerumen     Benign essential hypertension       Wt Readings from Last 2 Encounters:   01/03/23 84.8 kg (187 lb)   11/29/22 85.7 kg (188 lb 14.4 oz)     BP Readings from Last 3 Encounters:   01/03/23 (!) 142/86   11/29/22 (!) 158/83   11/11/21 (!) 145/86       Allergies   Allergen Reactions     Penicillins        Current Outpatient Medications   Medication     amphetamine-dextroamphetamine (ADDERALL XR) 10 MG 24 hr capsule     amphetamine-dextroamphetamine (ADDERALL) 5 MG tablet     fish oil-omega-3 fatty acids 1000 MG capsule     Multiple Vitamins-Minerals (MULTIVITAMIN ADULT PO)     carbamide peroxide (DEBROX) 6.5 % otic solution     No current facility-administered medications for this visit.       Social History     Tobacco Use     Smoking status: Never     Smokeless tobacco: Never   Vaping Use     Vaping Use: Never used   Substance Use Topics     Alcohol use: Yes     Alcohol/week: 6.0 standard drinks     Types: 6 Cans of beer per week     Comment: seldom     Drug use: No       Honoring Choices - Health Care Directive Guide offered to patient at time of visit.    Health Maintenance Due   Topic Date Due     YEARLY PREVENTIVE VISIT  Never done     ADVANCE CARE PLANNING  Never done     DEPRESSION ACTION PLAN  Never done     HIV SCREENING  Never done     HEPATITIS C SCREENING  Never done     DTAP/TDAP/TD  IMMUNIZATION (2 - Td or Tdap) 01/13/2022     INFLUENZA VACCINE (1) 09/01/2022       Immunization History   Administered Date(s) Administered     COVID-19 Vaccine 18+ (Moderna) 03/21/2021, 04/18/2021, 01/13/2022     COVID-19 Vaccine Bivalent Booster 18+ (Moderna) 11/18/2022       No results found for: PAP    Recent Labs   Lab Test 11/29/22  1153 02/16/21  1523   A1C 5.9* 5.6   *  --    HDL 42  --    TRIG 159*  --    ALT 36 50   CR 1.00 1.20   GFRESTIMATED >90 75   GFRESTBLACK  --  87   ALBUMIN 4.7 4.0   POTASSIUM 4.4 4.4   TSH 1.01  --        PHQ-2 ( 1999 Pfizer) 1/3/2023 11/29/2022   Q1: Little interest or pleasure in doing things 0 0   Q2: Feeling down, depressed or hopeless 0 2   PHQ-2 Score 0 2   PHQ-2 Total Score (12-17 Years)- Positive if 3 or more points; Administer PHQ-A if positive - -       PHQ-9 SCORE 6/13/2018 8/26/2020 11/29/2022   PHQ-9 Total Score 0 0 7       DOMENICO-7 SCORE 6/13/2018 8/26/2020   Total Score 0 0       No flowsheet data found.    Clarita Soto, EMT    January 3, 2023 10:07 AM

## 2023-01-17 ENCOUNTER — TELEPHONE (OUTPATIENT)
Dept: FAMILY MEDICINE | Facility: CLINIC | Age: 43
End: 2023-01-17

## 2023-01-17 NOTE — TELEPHONE ENCOUNTER
Patient called about the medication lisinopril (ZESTRIL) 5 MG tablet said he is having side effects of irritability and would like to talk to provider about possibly switching to a different medication.

## 2023-01-18 ENCOUNTER — TELEPHONE (OUTPATIENT)
Dept: FAMILY MEDICINE | Facility: CLINIC | Age: 43
End: 2023-01-18

## 2023-01-18 NOTE — TELEPHONE ENCOUNTER
Returned patient phone call. Not tolerating lisinopril well. Feels irritable and angry, out of the norm for him. Also on ADHD. Reports feeling light headed on standing and gets a head rush. No cough reported. Will discontinue for now. No symptoms of headache, vision change, chest pain. He will log BPs next 2 weeks and then schedule follow up to review to determine whether alternate agent like hydrochlorothiazide is needed. Lucía SANTOS CNP

## 2023-03-30 NOTE — IP AVS SNAPSHOT
[facility] Radiology  [facility] [address]  Phone:  [phone]                                    After Visit Summary   2/8/2019    Tristian Magaña    MRN: [id number]           After Visit Summary Signature Page    I have received my discharge instructions, and my questions have been answered. I have discussed any challenges I see with this plan with the nurse or doctor.    ..........................................................................................................................................  Patient/Patient Representative Signature      ..........................................................................................................................................  Patient Representative Print Name and Relationship to Patient    ..................................................               ................................................  Date                                   Time    ..........................................................................................................................................  Reviewed by Signature/Title    ...................................................              ..............................................  Date                                               Time          22EPIC Rev 08/18        Spiral Flap Text: The defect edges were debeveled with a #15 scalpel blade.  Given the location of the defect, shape of the defect and the proximity to free margins a spiral flap was deemed most appropriate.  Using a sterile surgical marker, an appropriate rotation flap was drawn incorporating the defect and placing the expected incisions within the relaxed skin tension lines where possible. The area thus outlined was incised deep to adipose tissue with a #15 scalpel blade.  The skin margins were undermined to an appropriate distance in all directions utilizing iris scissors.

## 2023-05-06 ENCOUNTER — HEALTH MAINTENANCE LETTER (OUTPATIENT)
Age: 43
End: 2023-05-06

## 2023-09-27 ENCOUNTER — OFFICE VISIT (OUTPATIENT)
Dept: FAMILY MEDICINE | Facility: CLINIC | Age: 43
End: 2023-09-27
Payer: OTHER GOVERNMENT

## 2023-09-27 VITALS
RESPIRATION RATE: 17 BRPM | BODY MASS INDEX: 27.26 KG/M2 | HEIGHT: 70 IN | DIASTOLIC BLOOD PRESSURE: 86 MMHG | OXYGEN SATURATION: 98 % | SYSTOLIC BLOOD PRESSURE: 127 MMHG | HEART RATE: 97 BPM | WEIGHT: 190.4 LBS | TEMPERATURE: 97.8 F

## 2023-09-27 DIAGNOSIS — H61.23 BILATERAL IMPACTED CERUMEN: ICD-10-CM

## 2023-09-27 DIAGNOSIS — I10 BENIGN ESSENTIAL HYPERTENSION: ICD-10-CM

## 2023-09-27 DIAGNOSIS — R73.03 PRE-DIABETES: Primary | ICD-10-CM

## 2023-09-27 LAB — HBA1C MFR BLD: 5.7 % (ref 0–5.6)

## 2023-09-27 ASSESSMENT — ENCOUNTER SYMPTOMS
HEADACHES: 0
DIZZINESS: 0
FATIGUE: 0
FEVER: 0
RHINORRHEA: 0
SHORTNESS OF BREATH: 0
CHILLS: 0

## 2023-09-27 ASSESSMENT — PAIN SCALES - GENERAL: PAINLEVEL: NO PAIN (0)

## 2023-09-27 NOTE — NURSING NOTE
"ROOM:2  [facility]    Preferred Name: Tristian     How did you hear about us?  Current Patient    43 year old  Chief Complaint   Patient presents with     Hypertension     Has high blood pressure the last time he was here     Ear Problem     Possible impacted wax, has buzzing in right ear       Blood pressure 127/86, pulse 97, temperature 97.8  F (36.6  C), temperature source Oral, resp. rate 17, height 1.781 m (5' 10.1\"), weight 86.4 kg (190 lb 6.4 oz), SpO2 98 %. Body mass index is 27.24 kg/m .  BP completed using cuff size:        Patient Active Problem List   Diagnosis     PTSD (post-traumatic stress disorder)     DOMENICO (generalized anxiety disorder)     Moderate major depression (H)     Bilateral impacted cerumen     Benign essential hypertension       Wt Readings from Last 2 Encounters:   09/27/23 86.4 kg (190 lb 6.4 oz)   01/03/23 84.8 kg (187 lb)     BP Readings from Last 3 Encounters:   09/27/23 127/86   01/03/23 (!) 131/90   11/29/22 (!) 158/83       Allergies   Allergen Reactions     Penicillins        Current Outpatient Medications   Medication     amphetamine-dextroamphetamine (ADDERALL XR) 10 MG 24 hr capsule     amphetamine-dextroamphetamine (ADDERALL) 5 MG tablet     carbamide peroxide (DEBROX) 6.5 % otic solution     fish oil-omega-3 fatty acids 1000 MG capsule     Multiple Vitamins-Minerals (MULTIVITAMIN ADULT PO)     No current facility-administered medications for this visit.       Social History     Tobacco Use     Smoking status: Never     Smokeless tobacco: Never   Vaping Use     Vaping Use: Never used   Substance Use Topics     Alcohol use: Yes     Alcohol/week: 6.0 standard drinks of alcohol     Types: 6 Cans of beer per week     Comment: seldom     Drug use: No       Honoring Choices - Health Care Directive Guide offered to patient at time of visit.    Health Maintenance Due   Topic Date Due     YEARLY PREVENTIVE VISIT  Never done     ADVANCE CARE PLANNING  Never done     DEPRESSION ACTION " PLAN  Never done     HIV SCREENING  Never done     HEPATITIS C SCREENING  Never done     DTAP/TDAP/TD IMMUNIZATION (2 - Td or Tdap) 01/13/2022     PHQ-9  05/29/2023     INFLUENZA VACCINE (1) 09/01/2023       Immunization History   Administered Date(s) Administered     COVID-19 Bivalent 18+ (Moderna) 11/18/2022     COVID-19 Monovalent 18+ (Moderna) 03/21/2021, 04/18/2021, 01/13/2022       No results found for: PAP    Recent Labs   Lab Test 11/29/22  1153 02/16/21  1523   A1C 5.9* 5.6   *  --    HDL 42  --    TRIG 159*  --    ALT 36 50   CR 1.00 1.20   GFRESTIMATED >90 75   GFRESTBLACK  --  87   ALBUMIN 4.7 4.0   POTASSIUM 4.4 4.4   TSH 1.01  --            9/27/2023     8:59 AM 1/3/2023    10:05 AM   PHQ-2 ( 1999 Pfizer)   Q1: Little interest or pleasure in doing things 0 0   Q2: Feeling down, depressed or hopeless 0 0   PHQ-2 Score 0 0           6/13/2018     3:27 PM 8/26/2020     4:15 PM 11/29/2022    10:20 AM   PHQ-9 SCORE   PHQ-9 Total Score 0 0 7           6/13/2018     3:27 PM 8/26/2020     4:15 PM   DOMENICO-7 SCORE   Total Score 0 0            No data to display                Clarita Soto, EMT    September 27, 2023 9:02 AM

## 2023-09-27 NOTE — PROGRESS NOTES
Today's Date: Sep 27, 2023     Patient Tristian Magaña 1980 presents to the clinic today to address   Chief Complaint   Patient presents with    Hypertension     Has high blood pressure the last time he was here    Ear Problem     Possible impacted wax, has buzzing in right ear             SUBJECTIVE     History of Present Illness:    Tristian is a 43 year old male that presents today with complaints of ringing in right ear. Patient states the right ear ringing started 2 weeks ago. He experienced a similar event in January 2023 which resolved with ear flushing. He has been trying Debrox ear drops with no improvement in symptoms. Patient denies any fever, chills, headache, dizziness, balance changes, hearing changes, congestion, or runny nose.     The patient also presents today for follow up about his blood pressure. The patient states he has a blood pressure cuff that broke 1 month ago and he does not want to buy another one. Patient endorses running more. No other acute concerns/symptoms at time of exam.        Review of Systems   Constitutional:  Negative for chills, fatigue and fever.   HENT:  Positive for tinnitus. Negative for congestion, hearing loss and rhinorrhea.    Eyes:  Negative for visual disturbance.   Respiratory:  Negative for shortness of breath.    Neurological:  Negative for dizziness and headaches.   All other systems reviewed and are negative.        Allergies   Allergen Reactions    Penicillins         Current Outpatient Medications   Medication Instructions    amphetamine-dextroamphetamine (ADDERALL XR) 10 MG 24 hr capsule 10 mg, Oral, DAILY    amphetamine-dextroamphetamine (ADDERALL) 5 MG tablet 5 mg, Oral, DAILY    carbamide peroxide (DEBROX) 6.5 % otic solution 5 drops, Both Ears, DAILY PRN, Use for 3-4 days out of the month    fish oil-omega-3 fatty acids 2 g, Oral, DAILY    Multiple Vitamins-Minerals (MULTIVITAMIN ADULT PO) Oral, DAILY       Past Medical History:   Diagnosis Date     "Depression     DOMENICO (generalized anxiety disorder)     PTSD (post-traumatic stress disorder)         Family History   Problem Relation Age of Onset    No Known Problems Mother     Parkinsonism Father     Diabetes Father     Seizure Disorder Father     Dementia Father     Hyperlipidemia Father     Hypertension Father     No Known Problems Sister     Hypertension Brother     Heart Disease Maternal Grandmother     Diabetes Maternal Grandfather     Heart Disease Paternal Grandfather         Social History     Tobacco Use    Smoking status: Never    Smokeless tobacco: Never   Vaping Use    Vaping Use: Never used   Substance Use Topics    Alcohol use: Yes     Alcohol/week: 6.0 standard drinks of alcohol     Types: 6 Cans of beer per week     Comment: seldom    Drug use: No        History   Sexual Activity    Sexual activity: Not Currently            6/13/2018     3:27 PM 8/26/2020     4:15 PM 11/29/2022    10:20 AM   PHQ   PHQ-9 Total Score 0 0 7   Q9: Thoughts of better off dead/self-harm past 2 weeks Not at all Not at all Not at all        Immunization History   Administered Date(s) Administered    COVID-19 Bivalent 18+ (Moderna) 11/18/2022    COVID-19 Monovalent 18+ (Moderna) 03/21/2021, 04/18/2021, 01/13/2022                 OBJECTIVE     /86 (BP Location: Left arm, Patient Position: Sitting, Cuff Size: Adult Regular)   Pulse 97   Temp 97.8  F (36.6  C) (Oral)   Resp 17   Ht 1.781 m (5' 10.1\")   Wt 86.4 kg (190 lb 6.4 oz)   SpO2 98%   BMI 27.24 kg/m       Labs:  Lab Results   Component Value Date    WBC 9.1 11/29/2022    HGB 15.6 11/29/2022    HCT 48.2 11/29/2022     11/29/2022    CHOL 200 (H) 11/29/2022    TRIG 159 (H) 11/29/2022    HDL 42 11/29/2022    ALT 36 11/29/2022    AST 31 11/29/2022     11/29/2022    BUN 13.7 11/29/2022    CO2 22 11/29/2022    TSH 1.01 11/29/2022        Physical Exam  Constitutional:       General: He is not in acute distress.     Appearance: Normal appearance. He is " normal weight.   HENT:      Head: Normocephalic.      Right Ear: Tympanic membrane normal. There is impacted cerumen.      Left Ear: Tympanic membrane normal. There is impacted cerumen.      Ears:      Lakhani exam findings: Lateralizes left.     Right Rinne: AC > BC.     Left Rinne: AC > BC.     Comments: Post lavage Tms WNL.      Mouth/Throat:      Mouth: Mucous membranes are moist.      Pharynx: Oropharynx is clear. No oropharyngeal exudate or posterior oropharyngeal erythema.   Eyes:      Extraocular Movements: Extraocular movements intact.      Conjunctiva/sclera: Conjunctivae normal.      Pupils: Pupils are equal, round, and reactive to light.   Cardiovascular:      Rate and Rhythm: Normal rate and regular rhythm.      Heart sounds: Normal heart sounds. No murmur heard.  Pulmonary:      Effort: Pulmonary effort is normal. No respiratory distress.      Breath sounds: Normal breath sounds. No wheezing or rales.   Musculoskeletal:      Cervical back: Normal range of motion and neck supple.   Neurological:      General: No focal deficit present.      Mental Status: He is alert and oriented to person, place, and time. Mental status is at baseline.      Cranial Nerves: No cranial nerve deficit.      Sensory: No sensory deficit.   Psychiatric:         Mood and Affect: Mood normal.         Behavior: Behavior normal.         Thought Content: Thought content normal.         Judgment: Judgment normal.        Bilateral Lavage performed by CMA, patient tolerated well without complication.    Recent Results (from the past 2 hour(s))   Hemoglobin A1c    Collection Time: 09/27/23  9:20 AM   Result Value Ref Range    Hemoglobin A1C 5.7 (H) 0.0 - 5.6 %              ASSESSMENT/PLAN     1. Pre-diabetes  A1c today improved (previous A1C 5.9 on 11/29/22). Patient's weight up 3 lbs since last visit (11/29/22). Continue diet/exercise.  - Hemoglobin A1c    2. Bilateral impacted cerumen  Patient reported improvement in ringing post  lavage. Should symptoms return/persist, will refer to ENT.  - REMOVE IMPACTED CERUMEN    3. Benign essential hypertension  BP today is 127/86. Patient respectfully declined getting a new home BP monitor/cuff. Patient encouraged to continue exercising/running and to routinely check BP at local pharmacy.     Follow-Up:  - Follow up in 6 months for annual physical, sooner if needed.        Note by Velma Cheatham DNP Student    I, Anton SANTOS CNP reviewed and verified the nurse practitioner (NP)  student's documentation of the patient's history. I performed the exam and the medical decision making activities with the NP student, who was present for learning purposes.      Options for treatment and follow-up care were reviewed with the patient. Patient engaged in the decision making process and verbalized understanding of the options discussed and agreed with the final plan.  AVS printed and given to patient.    TOM Feng CNP    [facility]  [address] [place] [id number]  [phone]        Note: Chart documentation was done in part with Dragon Voice Recognition software.  Although reviewed after completion, some word and grammatical errors may remain. Please contact author for any clarification or concerns.

## 2023-11-02 ENCOUNTER — OFFICE VISIT (OUTPATIENT)
Dept: FAMILY MEDICINE | Facility: CLINIC | Age: 43
End: 2023-11-02
Payer: OTHER GOVERNMENT

## 2023-11-02 VITALS
TEMPERATURE: 97.9 F | HEART RATE: 74 BPM | OXYGEN SATURATION: 99 % | SYSTOLIC BLOOD PRESSURE: 126 MMHG | BODY MASS INDEX: 26.34 KG/M2 | DIASTOLIC BLOOD PRESSURE: 85 MMHG | HEIGHT: 70 IN | WEIGHT: 184 LBS

## 2023-11-02 DIAGNOSIS — M54.6 BILATERAL THORACIC BACK PAIN, UNSPECIFIED CHRONICITY: Primary | ICD-10-CM

## 2023-11-02 RX ORDER — CYCLOBENZAPRINE HCL 5 MG
5 TABLET ORAL
Qty: 10 TABLET | Refills: 0 | Status: SHIPPED | OUTPATIENT
Start: 2023-11-02 | End: 2024-04-29

## 2023-11-02 ASSESSMENT — ENCOUNTER SYMPTOMS
FEVER: 0
CHILLS: 0
BACK PAIN: 1
DYSURIA: 0

## 2023-11-02 NOTE — NURSING NOTE
"ROOM:2  [facility]    Preferred Name: Tristian     How did you hear about us?  Current Patient    43 year old  Chief Complaint   Patient presents with     Back Pain     For a month or so  Painful to get up and sit down   Worked out for a bit around the time the pain first started, also slept on the couch mostly at that time as well       Blood pressure 126/85, pulse 74, temperature 97.9  F (36.6  C), temperature source Oral, height 1.765 m (5' 9.5\"), weight 83.5 kg (184 lb), SpO2 99%. Body mass index is 26.78 kg/m .  BP completed using cuff size:        Patient Active Problem List   Diagnosis     PTSD (post-traumatic stress disorder)     DOMENICO (generalized anxiety disorder)     Moderate major depression (H)     Bilateral impacted cerumen     Benign essential hypertension       Wt Readings from Last 2 Encounters:   11/02/23 83.5 kg (184 lb)   09/27/23 86.4 kg (190 lb 6.4 oz)     BP Readings from Last 3 Encounters:   11/02/23 126/85   09/27/23 127/86   01/03/23 (!) 131/90       Allergies   Allergen Reactions     Penicillins        Current Outpatient Medications   Medication     amphetamine-dextroamphetamine (ADDERALL XR) 10 MG 24 hr capsule     amphetamine-dextroamphetamine (ADDERALL) 5 MG tablet     carbamide peroxide (DEBROX) 6.5 % otic solution     fish oil-omega-3 fatty acids 1000 MG capsule     Multiple Vitamins-Minerals (MULTIVITAMIN ADULT PO)     No current facility-administered medications for this visit.       Social History     Tobacco Use     Smoking status: Never     Smokeless tobacco: Never   Vaping Use     Vaping Use: Never used   Substance Use Topics     Alcohol use: Yes     Alcohol/week: 6.0 standard drinks of alcohol     Types: 6 Cans of beer per week     Comment: seldom     Drug use: No       Honoring Choices - Health Care Directive Guide offered to patient at time of visit.    Health Maintenance Due   Topic Date Due     YEARLY PREVENTIVE VISIT  Never done     ADVANCE CARE PLANNING  Never done     " "DEPRESSION ACTION PLAN  Never done     HEPATITIS B IMMUNIZATION (1 of 3 - 3-dose series) Never done     HIV SCREENING  Never done     HEPATITIS C SCREENING  Never done     DTAP/TDAP/TD IMMUNIZATION (1 - Tdap) Never done     PHQ-9  05/29/2023     INFLUENZA VACCINE (1) 09/01/2023     COVID-19 Vaccine (3 - 2023-24 season) 09/01/2023       Immunization History   Administered Date(s) Administered     COVID-19 Bivalent 18+ (Moderna) 11/18/2022     COVID-19 Monovalent 18+ (Moderna) 01/13/2022       No results found for: \"PAP\"    Recent Labs   Lab Test 09/27/23  0920 11/29/22  1153 02/16/21  1523   A1C 5.7* 5.9* 5.6   LDL  --  126*  --    HDL  --  42  --    TRIG  --  159*  --    ALT  --  36 50   CR  --  1.00 1.20   GFRESTIMATED  --  >90 75   GFRESTBLACK  --   --  87   ALBUMIN  --  4.7 4.0   POTASSIUM  --  4.4 4.4   TSH  --  1.01  --            9/27/2023     8:59 AM 1/3/2023    10:05 AM   PHQ-2 ( 1999 Pfizer)   Q1: Little interest or pleasure in doing things 0 0   Q2: Feeling down, depressed or hopeless 0 0   PHQ-2 Score 0 0           6/13/2018     3:27 PM 8/26/2020     4:15 PM 11/29/2022    10:20 AM   PHQ-9 SCORE   PHQ-9 Total Score 0 0 7           6/13/2018     3:27 PM 8/26/2020     4:15 PM   DOMENICO-7 SCORE   Total Score 0 0            No data to display                Priyank Bustamante    November 2, 2023 3:41 PM    "

## 2023-11-02 NOTE — PROGRESS NOTES
"Today's Date: Nov 2, 2023     Patient Tristian Magaña 1980 presents to the clinic today to address   Chief Complaint   Patient presents with    Back Pain     For a month or so  Painful to get up and sit down   Worked out for a bit around the time the pain first started, also slept on the couch mostly at that time as well             SUBJECTIVE     History of Present Illness:    43-year-old male past medical history PTSD, DOMENICO, and prediabetes presents to discuss back pain.  Patient reports back pain started approximately 1 month ago in the right thoracolumbar region.  The pain has moved to the left thoracolumbar region as well.  He denies any focal injury or fall.  He reports that he was \"rucking\" all summer which involves doing exercises with a weighted vest.  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  He also reports that he has been sleeping on his couch more.  Patient reports the pain has gradually improved, he just concerned that it has taken this long to improve.  He has used Aleve which has been helpful.  Patient denies saddle anesthesia or bowel/bladder incontinence.  He reports a history of overactive bladder but still has control of his voiding habits.  He denies fevers, chills, or pain with urination. No other acute concerns/symptoms at time of exam.              Review of Systems   Constitutional:  Negative for chills and fever.   Genitourinary:  Negative for dysuria.   Musculoskeletal:  Positive for back pain.   Constitutional, HEENT, cardiovascular, pulmonary, gi and gu systems are negative, except as otherwise noted.      Allergies   Allergen Reactions    Penicillins         Current Outpatient Medications   Medication Instructions    amphetamine-dextroamphetamine (ADDERALL XR) 10 MG 24 hr capsule 10 mg, Oral, DAILY    amphetamine-dextroamphetamine (ADDERALL) 5 MG tablet 5 mg, Oral, DAILY    carbamide peroxide (DEBROX) 6.5 % otic solution 5 " "drops, Both Ears, DAILY PRN, Use for 3-4 days out of the month    fish oil-omega-3 fatty acids 2 g, Oral, DAILY    Multiple Vitamins-Minerals (MULTIVITAMIN ADULT PO) Oral, DAILY       Past Medical History:   Diagnosis Date    Depression     DOMENICO (generalized anxiety disorder)     PTSD (post-traumatic stress disorder)         Family History   Problem Relation Age of Onset    No Known Problems Mother     Parkinsonism Father     Diabetes Father     Seizure Disorder Father     Dementia Father     Hyperlipidemia Father     Hypertension Father     No Known Problems Sister     Hypertension Brother     Heart Disease Maternal Grandmother     Diabetes Maternal Grandfather     Heart Disease Paternal Grandfather         Social History     Tobacco Use    Smoking status: Never    Smokeless tobacco: Never   Vaping Use    Vaping Use: Never used   Substance Use Topics    Alcohol use: Yes     Alcohol/week: 6.0 standard drinks of alcohol     Types: 6 Cans of beer per week     Comment: seldom    Drug use: No        History   Sexual Activity    Sexual activity: Not Currently            6/13/2018     3:27 PM 8/26/2020     4:15 PM 11/29/2022    10:20 AM   PHQ   PHQ-9 Total Score 0 0 7   Q9: Thoughts of better off dead/self-harm past 2 weeks Not at all Not at all Not at all        Immunization History   Administered Date(s) Administered    COVID-19 Bivalent 18+ (Moderna) 11/18/2022    COVID-19 Monovalent 18+ (Moderna) 01/13/2022                 OBJECTIVE     /85   Pulse 74   Temp 97.9  F (36.6  C) (Oral)   Ht 1.765 m (5' 9.5\")   Wt 83.5 kg (184 lb)   SpO2 99%   BMI 26.78 kg/m       Labs:  Lab Results   Component Value Date    WBC 9.1 11/29/2022    HGB 15.6 11/29/2022    HCT 48.2 11/29/2022     11/29/2022    CHOL 200 (H) 11/29/2022    TRIG 159 (H) 11/29/2022    HDL 42 11/29/2022    ALT 36 11/29/2022    AST 31 11/29/2022     11/29/2022    BUN 13.7 11/29/2022    CO2 22 11/29/2022    TSH 1.01 11/29/2022        Physical " Exam  Constitutional:       General: He is not in acute distress.     Appearance: Normal appearance. He is not ill-appearing.   Cardiovascular:      Rate and Rhythm: Normal rate.   Pulmonary:      Effort: Pulmonary effort is normal. No respiratory distress.   Abdominal:      Tenderness: There is no right CVA tenderness or left CVA tenderness.   Musculoskeletal:      Cervical back: Neck supple.      Thoracic back: Tenderness present. No swelling or bony tenderness.      Lumbar back: No swelling or bony tenderness. Negative right straight leg raise test and negative left straight leg raise test.        Back:       Comments: Tenderness as noted above.   Neurological:      General: No focal deficit present.      Mental Status: He is alert.   Psychiatric:         Thought Content: Thought content normal.         Judgment: Judgment normal.               ASSESSMENT/PLAN     1. Bilateral thoracic back pain, unspecified chronicity  This is a pleasant 43-year-old male who presents with 1 month history of atraumatic bilateral thoracolumbar pain.  We will hold off on UA in the absence of dysuria or CVA tenderness.  Short-term course of cyclobenzaprine provided to use at night (patient was advised 5 mg ineffective, he may use 10 mg).  Patient instructed not to take cyclobenzaprine during the daytime due to excess drowsiness.  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 Patient declined referral at this time as he would prefer to monitor.  Should the patient change his mind regarding PT, we will gladly place a referral for him.  In the interim, advised patient to not sleep on couch, intermittent icing, and rest.  - cyclobenzaprine (FLEXERIL) 5 MG tablet; Take 1 tablet (5 mg) by mouth nightly as needed for muscle spasms  Dispense: 10 tablet; Refill: 0      Education provided on at-home supportive measures including rest, intermitent ice.    Follow-Up:  - Follow up  in as needed, or if symptoms worsen or fail to improve.     Options for treatment and follow-up care were reviewed with the patient. Patient engaged in the decision making process and verbalized understanding of the options discussed and agreed with the final plan.  AVS printed and given to patient.    TOM Feng [facility]  [facility]  [address] [id number] [phone]        Note: Chart documentation was done in part with Dragon Voice Recognition software.  Although reviewed after completion, some word and grammatical errors may remain. Please contact author for any clarification or concerns.

## 2024-02-16 ENCOUNTER — OFFICE VISIT (OUTPATIENT)
Dept: FAMILY MEDICINE | Facility: CLINIC | Age: 44
End: 2024-02-16
Payer: OTHER GOVERNMENT

## 2024-02-16 VITALS
RESPIRATION RATE: 20 BRPM | DIASTOLIC BLOOD PRESSURE: 88 MMHG | HEIGHT: 68 IN | SYSTOLIC BLOOD PRESSURE: 122 MMHG | OXYGEN SATURATION: 96 % | WEIGHT: 182.4 LBS | HEART RATE: 102 BPM | TEMPERATURE: 98.4 F | BODY MASS INDEX: 27.65 KG/M2

## 2024-02-16 DIAGNOSIS — Z23 ENCOUNTER FOR IMMUNIZATION: ICD-10-CM

## 2024-02-16 DIAGNOSIS — K64.4 EXTERNAL HEMORRHOIDS: Primary | ICD-10-CM

## 2024-02-16 RX ORDER — HYDROCORTISONE 25 MG/G
CREAM TOPICAL 2 TIMES DAILY PRN
Qty: 30 G | Refills: 1 | Status: SHIPPED | OUTPATIENT
Start: 2024-02-16 | End: 2024-04-29

## 2024-02-16 RX ORDER — DOCUSATE SODIUM 100 MG/1
100 CAPSULE, LIQUID FILLED ORAL 2 TIMES DAILY
Qty: 90 CAPSULE | Refills: 1 | Status: SHIPPED | OUTPATIENT
Start: 2024-02-16 | End: 2024-04-29

## 2024-02-16 ASSESSMENT — ANXIETY QUESTIONNAIRES
GAD7 TOTAL SCORE: 0
GAD7 TOTAL SCORE: 0
2. NOT BEING ABLE TO STOP OR CONTROL WORRYING: NOT AT ALL
GAD7 TOTAL SCORE: 0
1. FEELING NERVOUS, ANXIOUS, OR ON EDGE: NOT AT ALL
3. WORRYING TOO MUCH ABOUT DIFFERENT THINGS: NOT AT ALL
7. FEELING AFRAID AS IF SOMETHING AWFUL MIGHT HAPPEN: NOT AT ALL
4. TROUBLE RELAXING: NOT AT ALL
7. FEELING AFRAID AS IF SOMETHING AWFUL MIGHT HAPPEN: NOT AT ALL
6. BECOMING EASILY ANNOYED OR IRRITABLE: NOT AT ALL
5. BEING SO RESTLESS THAT IT IS HARD TO SIT STILL: NOT AT ALL

## 2024-02-16 ASSESSMENT — PATIENT HEALTH QUESTIONNAIRE - PHQ9
10. IF YOU CHECKED OFF ANY PROBLEMS, HOW DIFFICULT HAVE THESE PROBLEMS MADE IT FOR YOU TO DO YOUR WORK, TAKE CARE OF THINGS AT HOME, OR GET ALONG WITH OTHER PEOPLE: NOT DIFFICULT AT ALL
SUM OF ALL RESPONSES TO PHQ QUESTIONS 1-9: 0
SUM OF ALL RESPONSES TO PHQ QUESTIONS 1-9: 0

## 2024-02-16 ASSESSMENT — PAIN SCALES - GENERAL: PAINLEVEL: NO PAIN (0)

## 2024-02-16 NOTE — NURSING NOTE
"ROOM:2  [facility]    Preferred Name: Tristian     How did you hear about us?  Current Patient     Services Provided? No    43 year old  Chief Complaint   Patient presents with     Rectal Problem     Patient mentions persistent hemorrhoid issues.         Blood pressure 122/88, pulse 113, temperature 98.4  F (36.9  C), temperature source Oral, resp. rate 20, height 1.725 m (5' 7.9\"), weight 82.7 kg (182 lb 6.4 oz), SpO2 96%. Body mass index is 27.82 kg/m .  BP completed using cuff size:        Patient Active Problem List   Diagnosis     PTSD (post-traumatic stress disorder)     DOMENICO (generalized anxiety disorder)     Moderate major depression (H)     Bilateral impacted cerumen     Benign essential hypertension       Wt Readings from Last 2 Encounters:   02/16/24 82.7 kg (182 lb 6.4 oz)   11/02/23 83.5 kg (184 lb)     BP Readings from Last 3 Encounters:   02/16/24 122/88   11/02/23 126/85   09/27/23 127/86       Allergies   Allergen Reactions     Penicillins        Current Outpatient Medications   Medication     amphetamine-dextroamphetamine (ADDERALL XR) 10 MG 24 hr capsule     amphetamine-dextroamphetamine (ADDERALL) 5 MG tablet     fish oil-omega-3 fatty acids 1000 MG capsule     Multiple Vitamins-Minerals (MULTIVITAMIN ADULT PO)     carbamide peroxide (DEBROX) 6.5 % otic solution     cyclobenzaprine (FLEXERIL) 5 MG tablet     No current facility-administered medications for this visit.       Social History     Tobacco Use     Smoking status: Never     Smokeless tobacco: Never   Vaping Use     Vaping Use: Never used   Substance Use Topics     Alcohol use: Yes     Alcohol/week: 6.0 standard drinks of alcohol     Types: 6 Cans of beer per week     Comment: seldom     Drug use: No       Honoring Choices - Health Care Directive Guide offered to patient at time of visit.    Health Maintenance Due   Topic Date Due     YEARLY PREVENTIVE VISIT  Never done     ADVANCE CARE PLANNING  Never done     DEPRESSION " "ACTION PLAN  Never done     HEPATITIS B IMMUNIZATION (1 of 3 - 3-dose series) Never done     HIV SCREENING  Never done     HEPATITIS C SCREENING  Never done     DTAP/TDAP/TD IMMUNIZATION (1 - Tdap) Never done     INFLUENZA VACCINE (1) 09/01/2023     COVID-19 Vaccine (3 - 2023-24 season) 09/01/2023       Immunization History   Administered Date(s) Administered     COVID-19 Bivalent 18+ (Moderna) 11/18/2022     COVID-19 Monovalent 18+ (Moderna) 01/13/2022     Influenza Vaccine >6 months,quad, PF 10/08/2020       No results found for: \"PAP\"    Recent Labs   Lab Test 09/27/23  0920 11/29/22  1153 02/16/21  1523   A1C 5.7* 5.9* 5.6   LDL  --  126*  --    HDL  --  42  --    TRIG  --  159*  --    ALT  --  36 50   CR  --  1.00 1.20   GFRESTIMATED  --  >90 75   GFRESTBLACK  --   --  87   ALBUMIN  --  4.7 4.0   POTASSIUM  --  4.4 4.4   TSH  --  1.01  --            9/27/2023     8:59 AM 1/3/2023    10:05 AM   PHQ-2 ( 1999 Pfizer)   Q1: Little interest or pleasure in doing things 0 0   Q2: Feeling down, depressed or hopeless 0 0   PHQ-2 Score 0 0           6/13/2018     3:27 PM 8/26/2020     4:15 PM 11/29/2022    10:20 AM 2/16/2024     2:24 PM   PHQ-9 SCORE   PHQ-9 Total Score MyChart    0   PHQ-9 Total Score 0 0 7 0           6/13/2018     3:27 PM 8/26/2020     4:15 PM 2/16/2024     2:25 PM   DOMENICO-7 SCORE   Total Score   0 (minimal anxiety)   Total Score 0 0 0            No data to display                Cm Garcia    February 16, 2024 2:33 PM    "

## 2024-02-16 NOTE — PROGRESS NOTES
"Today's Date: Feb 16, 2024     Patient Tristian Magaña 1980 presents to the clinic today to address   Chief Complaint   Patient presents with    Rectal Problem     Patient mentions persistent hemorrhoid issues.               SUBJECTIVE     History of Present Illness:      43-year-old male presents to clinic to discuss hemorrhoids.  Patient reports he has had hemorrhoids on and off for 3 to 4 years.  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  Symptoms include rectal discomfort, noticing \"something down there\", as well as blood on his toilet paper (after a few wipes).  Patient denies shortness of breath, fatigue, or bloody stools.  He denies constipation and family history of colon cancer.  He endorses adequate fiber intake. No other acute concerns/symptoms at time of exam.      Review of Systems   Constitutional, HEENT, cardiovascular, pulmonary, gi and gu systems are negative, except as otherwise noted.      Allergies   Allergen Reactions    Penicillins         Current Outpatient Medications   Medication Instructions    amphetamine-dextroamphetamine (ADDERALL XR) 10 MG 24 hr capsule 10 mg, Oral, DAILY    amphetamine-dextroamphetamine (ADDERALL) 5 MG tablet 5 mg, Oral, DAILY    carbamide peroxide (DEBROX) 6.5 % otic solution 5 drops, Both Ears, DAILY PRN, Use for 3-4 days out of the month    cyclobenzaprine (FLEXERIL) 5 mg, Oral, AT BEDTIME PRN    fish oil-omega-3 fatty acids 2 g, Oral, DAILY    Multiple Vitamins-Minerals (MULTIVITAMIN ADULT PO) Oral, DAILY       Past Medical History:   Diagnosis Date    Depression     DOMENICO (generalized anxiety disorder)     PTSD (post-traumatic stress disorder)         Family History   Problem Relation Age of Onset    No Known Problems Mother     Parkinsonism Father     Diabetes Father     Seizure Disorder Father     Dementia Father     Hyperlipidemia Father     Hypertension Father     No " "Known Problems Sister     Hypertension Brother     Heart Disease Maternal Grandmother     Diabetes Maternal Grandfather     Heart Disease Paternal Grandfather         Social History     Tobacco Use    Smoking status: Never    Smokeless tobacco: Never   Vaping Use    Vaping Use: Never used   Substance Use Topics    Alcohol use: Yes     Alcohol/week: 6.0 standard drinks of alcohol     Types: 6 Cans of beer per week     Comment: seldom    Drug use: No        History   Sexual Activity    Sexual activity: Not Currently            8/26/2020     4:15 PM 11/29/2022    10:20 AM 2/16/2024     2:24 PM   PHQ   PHQ-9 Total Score 0 7 0   Q9: Thoughts of better off dead/self-harm past 2 weeks Not at all Not at all Not at all        Immunization History   Administered Date(s) Administered    COVID-19 Bivalent 18+ (Moderna) 11/18/2022    COVID-19 Monovalent 18+ (Moderna) 01/13/2022    Influenza Vaccine >6 months,quad, PF 10/08/2020                 OBJECTIVE     /88 (BP Location: Left arm, Patient Position: Sitting, Cuff Size: Adult Regular)   Pulse 113   Temp 98.4  F (36.9  C) (Oral)   Resp 20   Ht 1.725 m (5' 7.9\")   Wt 82.7 kg (182 lb 6.4 oz)   SpO2 96%   BMI 27.82 kg/m       Labs:  Lab Results   Component Value Date    WBC 9.1 11/29/2022    HGB 15.6 11/29/2022    HCT 48.2 11/29/2022     11/29/2022    CHOL 200 (H) 11/29/2022    TRIG 159 (H) 11/29/2022    HDL 42 11/29/2022    ALT 36 11/29/2022    AST 31 11/29/2022     11/29/2022    BUN 13.7 11/29/2022    CO2 22 11/29/2022    TSH 1.01 11/29/2022        Physical Exam  Constitutional:       General: He is not in acute distress.     Appearance: He is not ill-appearing.      Comments: Patient declined chaperone.   Cardiovascular:      Rate and Rhythm: Regular rhythm. Tachycardia present.      Heart sounds: Normal heart sounds. No murmur heard.  Pulmonary:      Effort: Pulmonary effort is normal. No respiratory distress.      Breath sounds: Normal breath sounds. " No wheezing or rales.   Abdominal:      General: Bowel sounds are normal.      Palpations: Abdomen is soft.      Tenderness: There is no abdominal tenderness.   Genitourinary:     Rectum: External hemorrhoid present.      Comments: External hemorrhoid noted on inspection.  Musculoskeletal:      Cervical back: Neck supple.   Neurological:      General: No focal deficit present.      Mental Status: He is alert.   Psychiatric:         Thought Content: Thought content normal.         Judgment: Judgment normal.               ASSESSMENT/PLAN     1. External hemorrhoids    This is a pleasant 43-year-old male presents to discuss acute on chronic hemorrhoids after starting sedentary job.  Patient has mild tachycardia, otherwise stable vitals, and an external hemorrhoid noted on inspection of rectum.      Will start patient on hydrocortisone cream twice daily for 2 weeks and Colace twice daily.  Considering blood in toilet paper and the chronicity of his hemorrhoids, we will have the patient complete a colonoscopy to be thorough. Nonpharmacological measures provided in patient's AVS.      - docusate sodium (COLACE) 100 MG capsule; Take 1 capsule (100 mg) by mouth 2 times daily Hold if diarrhea starts  Dispense: 90 capsule; Refill: 1  - hydrocortisone, Perianal, (HYDROCORTISONE) 2.5 % cream; Place rectally 2 times daily as needed for hemorrhoids X 2 weeks  Dispense: 30 g; Refill: 1  - Adult GI  Referral - Procedure Only; Future    2. Encounter for immunization  - INFLUENZA VACCINE >6 MONTHS (AFLURIA/FLUZONE)        Follow-Up:  - Follow up in as needed, or if symptoms worsen or fail to improve.     Options for treatment and follow-up care were reviewed with the patient. Patient engaged in the decision making process and verbalized understanding of the options discussed and agreed with the final plan.  AVS printed and given to patient.    TOM Feng [facility]  Nurse  [place] [id number] [address] [address]  [phone]        Note: Chart documentation was done in part with Dragon Voice Recognition software.  Although reviewed after completion, some word and grammatical errors may remain. Please contact author for any clarification or concerns.

## 2024-02-21 ENCOUNTER — HOSPITAL ENCOUNTER (OUTPATIENT)
Facility: CLINIC | Age: 44
End: 2024-02-21
Attending: SURGERY | Admitting: SURGERY
Payer: OTHER GOVERNMENT

## 2024-02-21 ENCOUNTER — TELEPHONE (OUTPATIENT)
Dept: GASTROENTEROLOGY | Facility: CLINIC | Age: 44
End: 2024-02-21
Payer: OTHER GOVERNMENT

## 2024-02-21 NOTE — TELEPHONE ENCOUNTER
Pre assessment completed for upcoming procedure.      Procedure details:    Patient scheduled for Colonoscopy on 3/4/24.     Arrival time: 1030. Procedure time 1115    Pre op exam needed? N/A    Facility location: [facility]; [address] [place] [address] [place] [address]    Sedation type: Conscious sedation     Indication for procedure: External hemorrhoids    COVID policy reviewed.    Designated  policy reviewed. Instructed to have someone stay 6 hours post procedure.       Chart review:     Electronic implanted devices? No    Recent diagnosis of diverticulitis within the last 6 weeks?  No    Diabetic? No    Medication review:    Anticoagulants? No    NSAIDS? No    Other medication HOLDING recommendations:  N/A      Prep for procedure:     Bowel prep recommendation: Standard Miralax  Due to: standard bowel prep.    Prep instructions sent via [employer]     Reviewed procedure prep instructions.     Patient verbalized understanding and had no questions or concerns at this time.        Roxanne Krause RN  Endoscopy Procedure Pre Assessment RN  [phone] option 4

## 2024-02-21 NOTE — TELEPHONE ENCOUNTER
"Endoscopy Scheduling Screen    Have you had a positive Covid test in the last 14 days?  No    Are you active on MyChart?   Yes    What insurance is in the chart?  Other:  [facility]     Ordering/Referring Provider:     BERLIN HAGEN      (If ordering provider performs procedure, schedule with ordering provider unless otherwise instructed. )    BMI: Estimated body mass index is 27.82 kg/m  as calculated from the following:    Height as of 2/16/24: 1.725 m (5' 7.9\").    Weight as of 2/16/24: 82.7 kg (182 lb 6.4 oz).     Sedation Ordered  moderate sedation.   If patient BMI > 50 do not schedule in [facility].    If patient BMI > 45 do not schedule at [facility].    Are you taking methadone or Suboxone?  No    Have you had difficulties, pain, or discomfort during past endoscopy procedures?  No    Are you taking any prescription medications for pain 3 or more times per week?   NO - No RN review required.    Do you have a history of malignant hyperthermia or adverse reaction to anesthesia?  No    (Females) Are you currently pregnant?   No     Have you been diagnosed or told you have pulmonary hypertension?   No    Do you have an LVAD?  No    Have you been told you have moderate to severe sleep apnea?  No    Have you been told you have COPD, asthma, or any other lung disease?  No    Do you have any heart conditions?  No     Have you ever had an organ transplant?   No    Have you ever had or are you awaiting a heart or lung transplant?   No    Have you had a stroke or transient ischemic attack (TIA aka \"mini stroke\" in the last 6 months?   No    Have you been diagnosed with or been told you have cirrhosis of the liver?   No    Are you currently on dialysis?   No    Do you need assistance transferring?   No    BMI: Estimated body mass index is 27.82 kg/m  as calculated from the following:    Height as of 2/16/24: 1.725 m (5' 7.9\").    Weight as of 2/16/24: 82.7 kg (182 lb 6.4 oz).     Is patients BMI > 40 and scheduling location " [facility]?  No    Do you take an injectable medication for weight loss or diabetes (excluding insulin)?  No    Do you take the medication Naltrexone?  No    Do you take blood thinners?  No       Prep   Are you currently on dialysis or do you have chronic kidney disease?  No    Do you have a diagnosis of diabetes?  No    Do you have a diagnosis of cystic fibrosis (CF)?  No    On a regular basis do you go 3 -5 days between bowel movements?  No    BMI > 40?  No    Preferred Pharmacy:      [facility][id number] [address] AT [address] [address]  Phone: [phone] Fax: [phone]      Final Scheduling Details       Procedure scheduled  Colonoscopy    Surgeon:        Date of procedure:  03/04/2024     Pre-OP / PAC:   No - Not required for this site.    Location  [facility] - Per order.    Sedation   Moderate Sedation - Per order.      Patient Reminders:   You will receive a call from a Nurse to review instructions and health history.  This assessment must be completed prior to your procedure.  Failure to complete the Nurse assessment may result in the procedure being cancelled.      On the day of your procedure, please designate an adult(s) who can drive you home stay with you for the next 24 hours. The medicines used in the exam will make you sleepy. You will not be able to drive.      You cannot take public transportation, ride share services, or non-medical taxi service without a responsible caregiver.  Medical transport services are allowed with the requirement that a responsible caregiver will receive you at your destination.  We require that drivers and caregivers are confirmed prior to your procedure.

## 2024-03-01 ENCOUNTER — TELEPHONE (OUTPATIENT)
Dept: GASTROENTEROLOGY | Facility: CLINIC | Age: 44
End: 2024-03-01
Payer: OTHER GOVERNMENT

## 2024-03-01 NOTE — TELEPHONE ENCOUNTER
Caller: Tristian Magaña     Reason for Reschedule/Cancellation   (please be detailed, any staff messages or encounters to note?): not ready to follow diet for procedure      Prior to reschedule please review:  Ordering Provider: Anton Mclaughlin  Sedation Determined: moderate  Does patient have any ASC Exclusions, please identify?: n      Notes on Cancelled Procedure:  Procedure: Lower Endoscopy [Colonoscopy]   Date: 3/4  Location: [facility]; [address] [place] [address]   Surgeon:       Rescheduled: Yes,   Procedure: Lower Endoscopy [Colonoscopy]    Date: 4/24   Location: [facility]; [address], [place] [address]   Surgeon: Simone   Sedation Level Scheduled  moderate,  Reason for Sedation Level ordered   Instructions updated and sent: y    Does patient need PAC or Pre -Op Rescheduled? : no       Did you cancel or rescheduled an EUS procedure? No.

## 2024-03-22 ENCOUNTER — TELEPHONE (OUTPATIENT)
Dept: FAMILY MEDICINE | Facility: CLINIC | Age: 44
End: 2024-03-22

## 2024-03-22 DIAGNOSIS — K64.4 EXTERNAL HEMORRHOIDS: Primary | ICD-10-CM

## 2024-03-22 NOTE — TELEPHONE ENCOUNTER
Would like a referral for a Hemorid that is not going away to see a specialist. He was seen on 2/16/24 by Jose Miguel. I told him that I would reach out to the provider to see if he can put in a referral or if he needs an appointment.   Clarita MEJIA, EMT 1:43 PM 3/22/2024

## 2024-03-26 NOTE — TELEPHONE ENCOUNTER
Diagnosis, Referred by & from: Hemorrhoids   Appt date: 6/7/2024   NOTES STATUS DETAILS   OFFICE NOTE from referring provider [facility]:  2/16/24 - PCC OV with Anton Mclaughlin NP   OFFICE NOTE from other specialist [facility]:  3/9/21 - URO OV with Julianna Thrasher NP    [facility]:  2/16/21 - PCC OV with PJ Sutton   DISCHARGE SUMMARY from hospital N/A    DISCHARGE REPORT from the ER N/A    OPERATIVE REPORT N/A    MEDICATION LIST Internal    LABS     ANAL PAP/CEA N/A    BIOPSIES/PATHOLOGY RELATED TO DIAGNOSIS N/A    DIAGNOSTIC PROCEDURES     PFC TESTING (from the Pelvic floor center includes Manometry, PDNL, EMG, etc.) N/A    COLONOSCOPY (most recent all time after 5 years) N/A    FLEX SIGMOIDOSCOPY N/A    UPPER ENDOSCOPY (EGD) N/A    ERCP N/A    IMAGING (DISC & REPORT)      CT N/A    MRI N/A    XRAY N/A    ULTRASOUND  (ENDOANAL/ENDORECTAL) N/A

## 2024-03-29 ENCOUNTER — TELEPHONE (OUTPATIENT)
Dept: GASTROENTEROLOGY | Facility: CLINIC | Age: 44
End: 2024-03-29
Payer: OTHER GOVERNMENT

## 2024-03-29 NOTE — TELEPHONE ENCOUNTER
Caller: Tristian    Reason for Reschedule/Cancellation   (please be detailed, any staff messages or encounters to note?): Does not want to go through with procedure      Prior to reschedule please review:  Ordering Provider: Anton Mclaughlin  Sedation Determined: Moderate  Does patient have any ASC Exclusions, please identify?: N      Notes on Cancelled Procedure:  Procedure: Lower Endoscopy [Colonoscopy]   Date: 4/24/24  Location: [facility]; [address] [place] [address] [address] [facility] [place], [place] [address]  Surgeon: Simone      Rescheduled: No,         Did you cancel or rescheduled an EUS procedure? No.

## 2024-04-29 ENCOUNTER — OFFICE VISIT (OUTPATIENT)
Dept: FAMILY MEDICINE | Facility: CLINIC | Age: 44
End: 2024-04-29
Payer: OTHER GOVERNMENT

## 2024-04-29 VITALS
HEART RATE: 97 BPM | SYSTOLIC BLOOD PRESSURE: 131 MMHG | WEIGHT: 182 LBS | BODY MASS INDEX: 27.58 KG/M2 | DIASTOLIC BLOOD PRESSURE: 89 MMHG | TEMPERATURE: 98.2 F | OXYGEN SATURATION: 96 % | HEIGHT: 68 IN

## 2024-04-29 DIAGNOSIS — M54.42 ACUTE LEFT-SIDED LOW BACK PAIN WITH LEFT-SIDED SCIATICA: Primary | ICD-10-CM

## 2024-04-29 NOTE — PROGRESS NOTES
"Today's Date: Apr 29, 2024     Patient Tristian Magaña 1980 presents to the clinic today to address   Chief Complaint   Patient presents with    Sciatic Nerve Pain             SUBJECTIVE     History of Present Illness:    44-year-old male presents to clinic discuss left-sided sciatic pain.  Patient reports symptoms started last Thursday, 4/25/2024 when his left back \"felt weird.\"  On Friday, 4/26/2024, patient developed worsening back pain with pain/numbness/tingling down his left leg.  The pain at one point was 9 out of 10, causing patient to lay in bed for an hour.  Since Friday, the pain has improved, however, he still feels stiff.  Patient believes his symptoms are secondary to chronic crossing his leg when he sits.  He denies preceding trauma.  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  Patient denies associated fevers, saddle anesthesia, bowel/bladder incontinence, or UTI symptoms including burning with urination.  His symptoms have improved with naproxen. Pain is currently 1/10. No other acute concerns/symptoms at time of exam.    Review of Systems   Constitutional, HEENT, cardiovascular, pulmonary, gi and gu systems are negative, except as otherwise noted.      Allergies   Allergen Reactions    Penicillins         Current Outpatient Medications   Medication Instructions    amphetamine-dextroamphetamine (ADDERALL XR) 10 MG 24 hr capsule 10 mg, Oral, DAILY    amphetamine-dextroamphetamine (ADDERALL) 5 MG tablet 5 mg, Oral, DAILY    carbamide peroxide (DEBROX) 6.5 % otic solution 5 drops, Both Ears, DAILY PRN, Use for 3-4 days out of the month    cyclobenzaprine (FLEXERIL) 5 mg, Oral, AT BEDTIME PRN    docusate sodium (COLACE) 100 mg, Oral, 2 TIMES DAILY, Hold if diarrhea starts    fish oil-omega-3 fatty acids 2 g, Oral, DAILY    hydrocortisone, Perianal, (HYDROCORTISONE) 2.5 % cream Rectal, 2 TIMES DAILY " "PRN, X 2 weeks    Multiple Vitamins-Minerals (MULTIVITAMIN ADULT PO) Oral, DAILY       Past Medical History:   Diagnosis Date    Depression     DOMENICO (generalized anxiety disorder)     PTSD (post-traumatic stress disorder)         Family History   Problem Relation Age of Onset    No Known Problems Mother     Parkinsonism Father     Diabetes Father     Seizure Disorder Father     Dementia Father     Hyperlipidemia Father     Hypertension Father     No Known Problems Sister     Hypertension Brother     Heart Disease Maternal Grandmother     Diabetes Maternal Grandfather     Heart Disease Paternal Grandfather         Social History     Tobacco Use    Smoking status: Never    Smokeless tobacco: Never   Vaping Use    Vaping status: Never Used   Substance Use Topics    Alcohol use: Yes     Alcohol/week: 6.0 standard drinks of alcohol     Types: 6 Cans of beer per week     Comment: seldom    Drug use: No        History   Sexual Activity    Sexual activity: Not Currently            8/26/2020     4:15 PM 11/29/2022    10:20 AM 2/16/2024     2:24 PM   PHQ   PHQ-9 Total Score 0 7 0   Q9: Thoughts of better off dead/self-harm past 2 weeks Not at all Not at all Not at all        Immunization History   Administered Date(s) Administered    COVID-19 Bivalent 18+ (Moderna) 11/18/2022    COVID-19 Monovalent 18+ (Moderna) 03/21/2021, 04/18/2021, 01/13/2022    Influenza Vaccine >6 months,quad, PF 10/08/2020, 02/16/2024                 OBJECTIVE     /89   Pulse 97   Temp 98.2  F (36.8  C) (Oral)   Ht 1.725 m (5' 7.9\")   Wt 82.6 kg (182 lb)   SpO2 96%   BMI 27.75 kg/m       Labs:  Lab Results   Component Value Date    WBC 9.1 11/29/2022    HGB 15.6 11/29/2022    HCT 48.2 11/29/2022     11/29/2022    CHOL 200 (H) 11/29/2022    TRIG 159 (H) 11/29/2022    HDL 42 11/29/2022    ALT 36 11/29/2022    AST 31 11/29/2022     11/29/2022    BUN 13.7 11/29/2022    CO2 22 11/29/2022    TSH 1.01 11/29/2022        Physical " Exam  Constitutional:       General: He is not in acute distress.     Appearance: He is not ill-appearing.   Cardiovascular:      Rate and Rhythm: Normal rate and regular rhythm.      Heart sounds: Normal heart sounds. No murmur heard.  Pulmonary:      Effort: Pulmonary effort is normal. No respiratory distress.      Breath sounds: Normal breath sounds. No wheezing or rales.   Musculoskeletal:      Cervical back: Neck supple.      Lumbar back: No tenderness or bony tenderness. Decreased range of motion (with lumbar extension.). Negative right straight leg raise test and negative left straight leg raise test.      Right lower leg: No edema.      Left lower leg: No edema.      Comments: Pain increased with lumbar flexion.   Neurological:      General: No focal deficit present.      Mental Status: He is alert.      Sensory: No sensory deficit.      Motor: No weakness.      Comments: BLE strength 5/5.   Psychiatric:         Thought Content: Thought content normal.         Judgment: Judgment normal.               ASSESSMENT/PLAN       1. Acute left-sided low back pain with left-sided sciatica    This is a pleasant 44-year-old male who presents to clinic to discuss left-sided sciatic pain.  Symptoms started on Thursday, 4/25/2024 and escalated on Friday, 4/26/2024 where patient developed worsening back pain with pain/numbness/tingling down his left leg.    Patient is currently afebrile and in no acute distress.  He has no bony lumbar tenderness.  Straight leg raise negative bilaterally.  In the absence of trauma,saddle anesthesia, bowel/bladder incontinence, focal weakness or sensory deficit, will hold off on imaging today.  We discussed short burst of corticosteroids versus continuing NSAIDs and rest.  Patient opted to continue with over-the-counter NSAIDs which is reasonable considering his symptoms are improving.      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    - Physical Therapy  Referral; Future      Education provided on at-home supportive measures including rest, intermitent ice, limiting aggrivating movements.    Follow-Up:  - Follow up in as needed, or if symptoms worsen or fail to improve.     Options for treatment and follow-up care were reviewed with the patient. Patient engaged in the decision making process and verbalized understanding of the options discussed and agreed with the final plan.  AVS printed and given to patient.    TOM Feng [facility]  Nurse Practitioners [address] [address]  [phone]        Note: Chart documentation was done in part with Dragon Voice Recognition software.  Although reviewed after completion, some word and grammatical errors may remain. Please contact author for any clarification or concerns.

## 2024-04-29 NOTE — NURSING NOTE
"ROOM:  [facility]    Preferred Name: Trist[facility]     How did you hear about us?  Current Patient     Services Provided? No    44 year old  Chief Complaint   Patient presents with    Sciatic Nerve Pain       Blood pressure 131/89, pulse 97, temperature 98.2  F (36.8  C), temperature source Oral, height 1.725 m (5' 7.9\"), weight 82.6 kg (182 lb), SpO2 96%. Body mass index is 27.75 kg/m .  BP completed using cuff size:        Patient Active Problem List   Diagnosis    PTSD (post-traumatic stress disorder)    DOMENICO (generalized anxiety disorder)    Moderate major depression (H)    Bilateral impacted cerumen    Benign essential hypertension       Wt Readings from Last 2 Encounters:   04/29/24 82.6 kg (182 lb)   02/16/24 82.7 kg (182 lb 6.4 oz)     BP Readings from Last 3 Encounters:   04/29/24 131/89   02/16/24 122/88   11/02/23 126/85       Allergies   Allergen Reactions    Penicillins        Current Outpatient Medications   Medication Sig Dispense Refill    amphetamine-dextroamphetamine (ADDERALL XR) 10 MG 24 hr capsule Take 10 mg by mouth daily      amphetamine-dextroamphetamine (ADDERALL) 5 MG tablet Take 5 mg by mouth daily      fish oil-omega-3 fatty acids 1000 MG capsule Take 2 g by mouth daily      Multiple Vitamins-Minerals (MULTIVITAMIN ADULT PO) Take by mouth daily      carbamide peroxide (DEBROX) 6.5 % otic solution Place 5 drops into both ears daily as needed for other (prn ear wax.) Use for 3-4 days out of the month (Patient not taking: Reported on 2/16/2024) 15 mL 0    cyclobenzaprine (FLEXERIL) 5 MG tablet Take 1 tablet (5 mg) by mouth nightly as needed for muscle spasms (Patient not taking: Reported on 2/16/2024) 10 tablet 0    docusate sodium (COLACE) 100 MG capsule Take 1 capsule (100 mg) by mouth 2 times daily Hold if diarrhea starts (Patient not taking: Reported on 4/29/2024) 90 capsule 1    hydrocortisone, Perianal, (HYDROCORTISONE) 2.5 % cream Place rectally 2 times daily as needed for " "hemorrhoids X 2 weeks (Patient not taking: Reported on 4/29/2024) 30 g 1     No current facility-administered medications for this visit.       Social History     Tobacco Use    Smoking status: Never    Smokeless tobacco: Never   Vaping Use    Vaping status: Never Used   Substance Use Topics    Alcohol use: Yes     Alcohol/week: 6.0 standard drinks of alcohol     Types: 6 Cans of beer per week     Comment: seldom    Drug use: No       Honoring Choices - Health Care Directive Guide offered to patient at time of visit.    Health Maintenance Due   Topic Date Due    YEARLY PREVENTIVE VISIT  Never done    ADVANCE CARE PLANNING  Never done    DEPRESSION ACTION PLAN  Never done    HIV SCREENING  Never done    HEPATITIS C SCREENING  Never done    IPV IMMUNIZATION (2 of 3 - Adult catch-up series) 02/10/2012    DTAP/TDAP/TD IMMUNIZATION (2 - Td or Tdap) 01/13/2022    COVID-19 Vaccine (5 - 2023-24 season) 09/01/2023       Immunization History   Administered Date(s) Administered    COVID-19 Bivalent 18+ (Moderna) 11/18/2022    COVID-19 Monovalent 18+ (Moderna) 03/21/2021, 04/18/2021, 01/13/2022    Influenza Vaccine >6 months,quad, PF 10/08/2020, 02/16/2024       No results found for: \"PAP\"    Recent Labs   Lab Test 09/27/23  0920 11/29/22  1153 02/16/21  1523   A1C 5.7* 5.9* 5.6   LDL  --  126*  --    HDL  --  42  --    TRIG  --  159*  --    ALT  --  36 50   CR  --  1.00 1.20   GFRESTIMATED  --  >90 75   GFRESTBLACK  --   --  87   ALBUMIN  --  4.7 4.0   POTASSIUM  --  4.4 4.4   TSH  --  1.01  --            9/27/2023     8:59 AM 1/3/2023    10:05 AM   PHQ-2 ( 1999 Pf[employer])   Q1: Little interest or pleasure in doing things 0 0   Q2: Feeling down, depressed or hopeless 0 0   PHQ-2 Score 0 0           6/13/2018     3:27 PM 8/26/2020     4:15 PM 11/29/2022    10:20 AM 2/16/2024     2:24 PM   PHQ-9 SCORE   PHQ-9 Total Score MyChart    0   PHQ-9 Total Score 0 0 7 0           6/13/2018     3:27 PM 8/26/2020     4:15 PM 2/16/2024     " 2:25 PM   DOMENICO-7 SCORE   Total Score   0 (minimal anxiety)   Total Score 0 0 0            No data to display                Sherry Galvezalexiromina    April 29, 2024 3:37 PM

## 2024-05-23 ENCOUNTER — THERAPY VISIT (OUTPATIENT)
Dept: PHYSICAL THERAPY | Facility: CLINIC | Age: 44
End: 2024-05-23
Attending: NURSE PRACTITIONER
Payer: OTHER GOVERNMENT

## 2024-05-23 DIAGNOSIS — M54.42 ACUTE LEFT-SIDED LOW BACK PAIN WITH LEFT-SIDED SCIATICA: ICD-10-CM

## 2024-05-23 PROCEDURE — 97161 PT EVAL LOW COMPLEX 20 MIN: CPT | Mod: GP | Performed by: PHYSICAL THERAPIST

## 2024-05-23 PROCEDURE — 97110 THERAPEUTIC EXERCISES: CPT | Mod: GP | Performed by: PHYSICAL THERAPIST

## 2024-05-23 NOTE — PROGRESS NOTES
PHYSICAL THERAPY EVALUATION  Type of Visit: Evaluation    See electronic medical record for Abuse and Falls Screening details.  MAXIME: Got back from a road trip.  Brought leg in a figure four position and resultant pain.  Since then left leg has felt weak, loose.  Overall the pain is gone.  No hx of back pain.  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  Lifting-no problems.  Subjective       Presenting condition or subjective complaint:  left sided back pain beginning 4/25/24 the neck day pain worsening and pain tingling down left leg.  Since then sxs have decreased.  Date of onset: 04/25/24 4/25/24  Relevant medical history:     Dates & types of surgery:      Prior diagnostic imaging/testing results:       Prior therapy history for the same diagnosis, illness or injury: No      Living Environment  Social support: Alone   Type of home: Apartment/condo   Stairs to enter the home:         Ramp: No   Stairs inside the home: No       Help at home:    Equipment owned:       Employment: Yes    Hobbies/Interests:      Patient goals for therapy:  prevent reinjury, regain strength         Objective   LUMBAR SPINE EVALUATION       ROM:   (Degrees) Left AROM Left PROM  Right AROM Right PROM   Hip Flexion       Hip Extension       Hip Abduction       Hip Adduction       Hip Internal Rotation       Hip External Rotation       Knee Flexion       Knee Extension       Lumbar Side glide     Lumbar Flexion 100% and no sxs   Lumbar Extension 100% and no sxs      Negative SLR  SI screen clear  Lumbar myotomes 5/5 all levels    STRENGTH:  TA fair control  Good hamstring, quad flexibility ANTONETTE  PALPATION:  unremarkable  Assessment & Plan   CLINICAL IMPRESSIONS  Medical Diagnosis: low back pain left LE    Treatment Diagnosis: low back pain left LE   Impression/Assessment: Patient is a 44 year old male with lumbar DDD, sciatica complaints.  The following significant findings have been  identified: Pain, Decreased strength, and Decreased activity tolerance. These impairments interfere with their ability to perform self care tasks and work tasks as compared to previous level of function.     Clinical Decision Making (Complexity):  Clinical Presentation: Stable/Uncomplicated  Clinical Presentation Rationale: based on medical and personal factors listed in PT evaluation  Clinical Decision Making (Complexity): Low complexity    PLAN OF CARE  Treatment Interventions:  Interventions: Manual Therapy, Neuromuscular Re-education, Therapeutic Activity, Therapeutic Exercise    Long Term Goals     PT Goal 1  Goal Identifier: able to sit 8 hours no sxs  Rationale: to maximize safety and independence with performance of ADLs and functional tasks;to maximize safety and independence within the community  Target Date: 08/15/24      Frequency of Treatment: 1 time a week 2 weeks followed by 1 time every other week 2 months  Duration of Treatment: 3 months    Recommended Referrals to Other Professionals:  none  Education Assessment:        Risks and benefits of evaluation/treatment have been explained.   Patient/Family/caregiver agrees with Plan of Care.     Evaluation Time:     PT Eval, Low Complexity Minutes ([id number]): 20       Signing Clinician: Geraldo Hodge, PT      Spoke with patient on 5/29/24.  Reports no sxs able to sit 8 hours no sxs and no difficulties or questions with HEP.  Pt reports would like to continue self directed and is a good candidate at this time.

## 2024-06-07 ENCOUNTER — PRE VISIT (OUTPATIENT)
Dept: SURGERY | Facility: CLINIC | Age: 44
End: 2024-06-07

## 2024-07-14 ENCOUNTER — HEALTH MAINTENANCE LETTER (OUTPATIENT)
Age: 44
End: 2024-07-14

## 2024-11-21 PROBLEM — M54.42 LEFT-SIDED LOW BACK PAIN WITH LEFT-SIDED SCIATICA: Status: RESOLVED | Noted: 2024-05-23 | Resolved: 2024-11-21

## 2025-07-19 ENCOUNTER — HEALTH MAINTENANCE LETTER (OUTPATIENT)
Age: 45
End: 2025-07-19

## (undated) RX ORDER — DEXAMETHASONE SODIUM PHOSPHATE 10 MG/ML
INJECTION, SOLUTION INTRAMUSCULAR; INTRAVENOUS
Status: DISPENSED
Start: 2019-02-08

## (undated) RX ORDER — LIDOCAINE HYDROCHLORIDE 10 MG/ML
INJECTION, SOLUTION EPIDURAL; INFILTRATION; INTRACAUDAL; PERINEURAL
Status: DISPENSED
Start: 2019-02-08

## (undated) RX ORDER — LIDOCAINE HYDROCHLORIDE 10 MG/ML
INJECTION, SOLUTION EPIDURAL; INFILTRATION; INTRACAUDAL; PERINEURAL
Status: DISPENSED
Start: 2019-03-11

## (undated) RX ORDER — DEXAMETHASONE SODIUM PHOSPHATE 10 MG/ML
INJECTION, SOLUTION INTRAMUSCULAR; INTRAVENOUS
Status: DISPENSED
Start: 2019-03-11